# Patient Record
Sex: MALE | Race: WHITE | NOT HISPANIC OR LATINO | Employment: FULL TIME | RURAL
[De-identification: names, ages, dates, MRNs, and addresses within clinical notes are randomized per-mention and may not be internally consistent; named-entity substitution may affect disease eponyms.]

---

## 2020-05-14 ENCOUNTER — HISTORICAL (OUTPATIENT)
Dept: ADMINISTRATIVE | Facility: HOSPITAL | Age: 35
End: 2020-05-14

## 2021-12-30 ENCOUNTER — HOSPITAL ENCOUNTER (EMERGENCY)
Facility: HOSPITAL | Age: 36
Discharge: HOME | End: 2021-12-31
Payer: OTHER GOVERNMENT

## 2021-12-30 DIAGNOSIS — U07.1 COVID-19: Primary | ICD-10-CM

## 2021-12-30 PROCEDURE — 99282 PR EMERGENCY DEPT VISIT,LEVEL II: ICD-10-PCS | Mod: ,,, | Performed by: PEDIATRICS

## 2021-12-30 PROCEDURE — 99282 EMERGENCY DEPT VISIT SF MDM: CPT | Mod: ,,, | Performed by: PEDIATRICS

## 2021-12-30 PROCEDURE — 99283 EMERGENCY DEPT VISIT LOW MDM: CPT

## 2021-12-31 ENCOUNTER — TELEPHONE (OUTPATIENT)
Dept: EMERGENCY MEDICINE | Facility: HOSPITAL | Age: 36
End: 2021-12-31
Payer: OTHER GOVERNMENT

## 2021-12-31 ENCOUNTER — INFUSION (OUTPATIENT)
Dept: INFECTIOUS DISEASES | Facility: HOSPITAL | Age: 36
End: 2021-12-31
Attending: PEDIATRICS
Payer: OTHER GOVERNMENT

## 2021-12-31 VITALS
OXYGEN SATURATION: 96 % | RESPIRATION RATE: 20 BRPM | TEMPERATURE: 98 F | HEIGHT: 71 IN | DIASTOLIC BLOOD PRESSURE: 75 MMHG | HEART RATE: 88 BPM | WEIGHT: 210 LBS | BODY MASS INDEX: 29.4 KG/M2 | SYSTOLIC BLOOD PRESSURE: 118 MMHG

## 2021-12-31 VITALS
SYSTOLIC BLOOD PRESSURE: 144 MMHG | OXYGEN SATURATION: 100 % | TEMPERATURE: 98 F | RESPIRATION RATE: 16 BRPM | DIASTOLIC BLOOD PRESSURE: 79 MMHG | HEART RATE: 86 BPM

## 2021-12-31 DIAGNOSIS — U07.1 COVID-19: Primary | ICD-10-CM

## 2021-12-31 LAB
FLUAV AG UPPER RESP QL IA.RAPID: NEGATIVE
FLUBV AG UPPER RESP QL IA.RAPID: NEGATIVE
RAPID GROUP A STREP: NEGATIVE
SARS-COV+SARS-COV-2 AG RESP QL IA.RAPID: POSITIVE

## 2021-12-31 PROCEDURE — M0243 CASIRIVI AND IMDEVI INFUSION: HCPCS | Performed by: PEDIATRICS

## 2021-12-31 PROCEDURE — 63600175 PHARM REV CODE 636 W HCPCS: Performed by: PEDIATRICS

## 2021-12-31 PROCEDURE — 87880 STREP A ASSAY W/OPTIC: CPT | Performed by: PEDIATRICS

## 2021-12-31 PROCEDURE — 87081 CULTURE SCREEN ONLY: CPT | Performed by: PEDIATRICS

## 2021-12-31 PROCEDURE — 25000003 PHARM REV CODE 250: Performed by: PEDIATRICS

## 2021-12-31 PROCEDURE — 87428 SARSCOV & INF VIR A&B AG IA: CPT | Performed by: PEDIATRICS

## 2021-12-31 RX ORDER — SODIUM CHLORIDE 0.9 % (FLUSH) 0.9 %
10 SYRINGE (ML) INJECTION
Status: DISCONTINUED | OUTPATIENT
Start: 2021-12-31 | End: 2023-05-28 | Stop reason: CLARIF

## 2021-12-31 RX ORDER — ONDANSETRON 4 MG/1
4 TABLET, ORALLY DISINTEGRATING ORAL ONCE AS NEEDED
Status: DISCONTINUED | OUTPATIENT
Start: 2021-12-31 | End: 2023-05-28 | Stop reason: CLARIF

## 2021-12-31 RX ORDER — FLUOXETINE HYDROCHLORIDE 20 MG/1
20 CAPSULE ORAL DAILY
COMMUNITY

## 2021-12-31 RX ORDER — ACETAMINOPHEN 325 MG/1
650 TABLET ORAL ONCE AS NEEDED
Status: DISCONTINUED | OUTPATIENT
Start: 2021-12-31 | End: 2023-05-28 | Stop reason: CLARIF

## 2021-12-31 RX ORDER — ALBUTEROL SULFATE 90 UG/1
2 AEROSOL, METERED RESPIRATORY (INHALATION)
Status: DISCONTINUED | OUTPATIENT
Start: 2021-12-31 | End: 2023-05-28 | Stop reason: CLARIF

## 2021-12-31 RX ORDER — BUPROPION HYDROCHLORIDE 75 MG/1
75 TABLET ORAL DAILY
COMMUNITY

## 2021-12-31 RX ORDER — DEXTROAMPHETAMINE SACCHARATE, AMPHETAMINE ASPARTATE, DEXTROAMPHETAMINE SULFATE AND AMPHETAMINE SULFATE 7.5; 7.5; 7.5; 7.5 MG/1; MG/1; MG/1; MG/1
30 TABLET ORAL 2 TIMES DAILY
COMMUNITY

## 2021-12-31 RX ORDER — PANTOPRAZOLE SODIUM 40 MG/1
40 TABLET, DELAYED RELEASE ORAL DAILY
COMMUNITY
End: 2022-06-01 | Stop reason: SDUPTHER

## 2021-12-31 RX ORDER — ALPRAZOLAM 1 MG/1
1 TABLET ORAL DAILY PRN
COMMUNITY

## 2021-12-31 RX ORDER — DIPHENHYDRAMINE HYDROCHLORIDE 50 MG/ML
25 INJECTION INTRAMUSCULAR; INTRAVENOUS ONCE AS NEEDED
Status: DISCONTINUED | OUTPATIENT
Start: 2021-12-31 | End: 2023-05-28 | Stop reason: CLARIF

## 2021-12-31 RX ORDER — EPINEPHRINE 0.3 MG/.3ML
0.3 INJECTION SUBCUTANEOUS
Status: DISCONTINUED | OUTPATIENT
Start: 2021-12-31 | End: 2023-05-28 | Stop reason: CLARIF

## 2021-12-31 RX ORDER — ONDANSETRON 2 MG/ML
4 INJECTION INTRAMUSCULAR; INTRAVENOUS
Status: DISCONTINUED | OUTPATIENT
Start: 2021-12-31 | End: 2021-12-31

## 2021-12-31 RX ADMIN — CASIRIVIMAB 600 MG: 1332 INJECTION, SOLUTION, CONCENTRATE INTRAVENOUS at 09:12

## 2021-12-31 NOTE — DISCHARGE INSTRUCTIONS
Tylenol or ibuprofen for fever and body aches    Consider taking a multivitamin daily    Return tomorrow for antibody infusion

## 2021-12-31 NOTE — ED PROVIDER NOTES
Encounter Date: 12/30/2021       History     Chief Complaint   Patient presents with    COVID-19 Concerns    Cough    Headache    Fever    Weakness    Vomiting    Nausea    Dizziness    Shortness of Breath    Sore Throat       Patient reports exposure to COVID.  He does report that he has been immunized for COVID but no flu vaccine.  He reports he has had fevers chills nausea vomiting diarrhea aches all over.        Review of patient's allergies indicates:  No Known Allergies  Past Medical History:   Diagnosis Date    Depression     GERD (gastroesophageal reflux disease)      Past Surgical History:   Procedure Laterality Date    TONSILLECTOMY       History reviewed. No pertinent family history.  Social History     Tobacco Use    Smoking status: Current Every Day Smoker     Types: Cigarettes    Smokeless tobacco: Never Used   Substance Use Topics    Alcohol use: Yes    Drug use: Not Currently     Review of Systems   Constitutional: Positive for chills, diaphoresis and fever.   HENT: Positive for congestion and sore throat.    Respiratory: Positive for cough. Negative for shortness of breath.    Cardiovascular: Negative for chest pain.   Gastrointestinal: Positive for diarrhea and vomiting.   Musculoskeletal: Positive for myalgias.   Skin: Negative for color change and rash.   Neurological: Positive for headaches.   All other systems reviewed and are negative.      Physical Exam     Initial Vitals [12/30/21 2345]   BP Pulse Resp Temp SpO2   (!) 127/92 91 20 98.1 °F (36.7 °C) 95 %      MAP       --         Physical Exam    Nursing note and vitals reviewed.  Constitutional: He appears well-developed and well-nourished. He is diaphoretic.   HENT:   Mouth/Throat: Uvula is midline. Oropharyngeal exudate and posterior oropharyngeal erythema present. No posterior oropharyngeal edema.   Neck: Neck supple.   Cardiovascular: Normal rate, regular rhythm, normal heart sounds and intact distal pulses.   No murmur  heard.  Pulmonary/Chest: Breath sounds normal. No respiratory distress. He has no wheezes. He has no rhonchi.   Musculoskeletal:      Cervical back: Neck supple.     Neurological: He is alert and oriented to person, place, and time. He has normal strength.   Skin: Skin is warm. Capillary refill takes less than 2 seconds.   Psychiatric: He has a normal mood and affect. His behavior is normal. Judgment and thought content normal.         Medical Screening Exam   See Full Note    ED Course   Procedures  Labs Reviewed   SARS-COV2 (COVID) W/ FLU ANTIGEN - Abnormal; Notable for the following components:       Result Value    COVID-19 Ag Positive (*)     All other components within normal limits    Narrative:     Positive SARS-CoV antigen results indicate the presence of viral antigens; correlation with patient history and presence of clinical signs & symptoms consistent with COVID-19 are necessary to determine infection status.   THROAT SCREEN, RAPID STREP          Imaging Results    None          Medications - No data to display                    Clinical Impression:   Final diagnoses:  [U07.1] COVID-19 (Primary)          ED Disposition Condition    Discharge Stable        ED Prescriptions     None        Follow-up Information     Follow up With Specialties Details Why Contact Info    Rose Mary Camacho MD Family Medicine In 3 days If symptoms worsen [address] [facility] [address] [phone]             Andrae Nava MD  12/31/21 8854

## 2021-12-31 NOTE — ED TRIAGE NOTES
Presents with fever, cough, sore throat, N/V, diarrhea, HA, dizziness, and shortness of breath. Was around ppl with Covid on Sunday.

## 2022-01-02 LAB — DEPRECATED S PYO AG THROAT QL EIA: NORMAL

## 2022-06-01 ENCOUNTER — OFFICE VISIT (OUTPATIENT)
Dept: PRIMARY CARE CLINIC | Facility: CLINIC | Age: 37
End: 2022-06-01
Payer: COMMERCIAL

## 2022-06-01 VITALS
WEIGHT: 228 LBS | DIASTOLIC BLOOD PRESSURE: 86 MMHG | BODY MASS INDEX: 43.05 KG/M2 | OXYGEN SATURATION: 95 % | SYSTOLIC BLOOD PRESSURE: 132 MMHG | HEIGHT: 61 IN | TEMPERATURE: 98 F | RESPIRATION RATE: 18 BRPM | HEART RATE: 86 BPM

## 2022-06-01 DIAGNOSIS — R11.2 NAUSEA AND VOMITING, INTRACTABILITY OF VOMITING NOT SPECIFIED, UNSPECIFIED VOMITING TYPE: ICD-10-CM

## 2022-06-01 DIAGNOSIS — R63.5 WEIGHT GAIN: ICD-10-CM

## 2022-06-01 DIAGNOSIS — J30.2 SEASONAL ALLERGIC RHINITIS, UNSPECIFIED TRIGGER: ICD-10-CM

## 2022-06-01 DIAGNOSIS — K92.0 HEMATEMESIS WITH NAUSEA: ICD-10-CM

## 2022-06-01 DIAGNOSIS — Z00.00 ENCOUNTER FOR ROUTINE ADULT MEDICAL EXAMINATION: ICD-10-CM

## 2022-06-01 DIAGNOSIS — R10.826 EPIGASTRIC ABDOMINAL TENDERNESS WITH REBOUND TENDERNESS: Primary | ICD-10-CM

## 2022-06-01 DIAGNOSIS — K21.9 GASTROESOPHAGEAL REFLUX DISEASE, UNSPECIFIED WHETHER ESOPHAGITIS PRESENT: ICD-10-CM

## 2022-06-01 PROCEDURE — 3008F BODY MASS INDEX DOCD: CPT | Mod: CPTII,,, | Performed by: NURSE PRACTITIONER

## 2022-06-01 PROCEDURE — 3079F PR MOST RECENT DIASTOLIC BLOOD PRESSURE 80-89 MM HG: ICD-10-PCS | Mod: CPTII,,, | Performed by: NURSE PRACTITIONER

## 2022-06-01 PROCEDURE — 99204 PR OFFICE/OUTPT VISIT, NEW, LEVL IV, 45-59 MIN: ICD-10-PCS | Mod: ,,, | Performed by: NURSE PRACTITIONER

## 2022-06-01 PROCEDURE — 1160F PR REVIEW ALL MEDS BY PRESCRIBER/CLIN PHARMACIST DOCUMENTED: ICD-10-PCS | Mod: CPTII,,, | Performed by: NURSE PRACTITIONER

## 2022-06-01 PROCEDURE — 83014 H PYLORI DRUG ADMIN: CPT | Mod: ,,, | Performed by: CLINICAL MEDICAL LABORATORY

## 2022-06-01 PROCEDURE — 1159F PR MEDICATION LIST DOCUMENTED IN MEDICAL RECORD: ICD-10-PCS | Mod: CPTII,,, | Performed by: NURSE PRACTITIONER

## 2022-06-01 PROCEDURE — 83013 H PYLORI (C-13) BREATH: CPT | Mod: ,,, | Performed by: CLINICAL MEDICAL LABORATORY

## 2022-06-01 PROCEDURE — 83014 H. PYLORI BREATH TEST: ICD-10-PCS | Mod: ,,, | Performed by: CLINICAL MEDICAL LABORATORY

## 2022-06-01 PROCEDURE — 3075F PR MOST RECENT SYSTOLIC BLOOD PRESS GE 130-139MM HG: ICD-10-PCS | Mod: CPTII,,, | Performed by: NURSE PRACTITIONER

## 2022-06-01 PROCEDURE — 3079F DIAST BP 80-89 MM HG: CPT | Mod: CPTII,,, | Performed by: NURSE PRACTITIONER

## 2022-06-01 PROCEDURE — 84443 TSH: ICD-10-PCS | Mod: ,,, | Performed by: CLINICAL MEDICAL LABORATORY

## 2022-06-01 PROCEDURE — 3075F SYST BP GE 130 - 139MM HG: CPT | Mod: CPTII,,, | Performed by: NURSE PRACTITIONER

## 2022-06-01 PROCEDURE — 84443 ASSAY THYROID STIM HORMONE: CPT | Mod: ,,, | Performed by: CLINICAL MEDICAL LABORATORY

## 2022-06-01 PROCEDURE — 3008F PR BODY MASS INDEX (BMI) DOCUMENTED: ICD-10-PCS | Mod: CPTII,,, | Performed by: NURSE PRACTITIONER

## 2022-06-01 PROCEDURE — 80053 COMPREHENSIVE METABOLIC PANEL: ICD-10-PCS | Mod: ,,, | Performed by: CLINICAL MEDICAL LABORATORY

## 2022-06-01 PROCEDURE — 85025 CBC WITH DIFFERENTIAL: ICD-10-PCS | Mod: ,,, | Performed by: CLINICAL MEDICAL LABORATORY

## 2022-06-01 PROCEDURE — 83013 H. PYLORI BREATH TEST: ICD-10-PCS | Mod: ,,, | Performed by: CLINICAL MEDICAL LABORATORY

## 2022-06-01 PROCEDURE — 85025 COMPLETE CBC W/AUTO DIFF WBC: CPT | Mod: ,,, | Performed by: CLINICAL MEDICAL LABORATORY

## 2022-06-01 PROCEDURE — 1160F RVW MEDS BY RX/DR IN RCRD: CPT | Mod: CPTII,,, | Performed by: NURSE PRACTITIONER

## 2022-06-01 PROCEDURE — 1159F MED LIST DOCD IN RCRD: CPT | Mod: CPTII,,, | Performed by: NURSE PRACTITIONER

## 2022-06-01 PROCEDURE — 99204 OFFICE O/P NEW MOD 45 MIN: CPT | Mod: ,,, | Performed by: NURSE PRACTITIONER

## 2022-06-01 PROCEDURE — 80053 COMPREHEN METABOLIC PANEL: CPT | Mod: ,,, | Performed by: CLINICAL MEDICAL LABORATORY

## 2022-06-01 RX ORDER — MINERAL OIL
180 ENEMA (ML) RECTAL DAILY
Qty: 90 TABLET | Refills: 3 | Status: SHIPPED | OUTPATIENT
Start: 2022-06-01 | End: 2023-06-01

## 2022-06-01 RX ORDER — PANTOPRAZOLE SODIUM 40 MG/1
40 TABLET, DELAYED RELEASE ORAL DAILY
Qty: 90 TABLET | Refills: 1 | Status: SHIPPED | OUTPATIENT
Start: 2022-06-01

## 2022-06-01 NOTE — LETTER
June 1, 2022      [facility]  [address] [place] [address]  Phone: [phone]  Fax: [phone]       Patient: Jesus Rangel   YOB: 1985  Date of Visit: 06/01/2022    To Whom It May Concern:    Damaris Rangel  was at [facility] on 06/01/2022. The patient may return to work/school on 06/02/2022 with no restrictions. If you have any questions or concerns, or if I can be of further assistance, please do not hesitate to contact me.    Sincerely,    Alona Mathew LPN

## 2022-06-01 NOTE — LETTER
June 1, 2022      [facility]  [address] [place] [address]  Phone: [phone]  Fax: [phone]       Patient: Jesus Rangel   YOB: 1985  Date of Visit: 06/01/2022    To Whom It May Concern:    Damaris Rangel  was at [facility] on 06/01/2022. The patient may return to work on 6/2/2022 with no restrictions. If you have any questions or concerns, or if I can be of further assistance, please do not hesitate to contact me.    Sincerely,    Lupis Seay DNP, FNP-C

## 2022-06-01 NOTE — LETTER
June 1, 2022      [facility]  [address] [place] [address]  Phone: [phone]  Fax: [phone]       Patient: Jesus Rangel   YOB: 1985  Date of Visit: 06/01/2022    To Whom It May Concern:    Damaris Rangel  was at [facility] on 06/01/2022. The patient may return to work/school on 06/02/2022 with no restrictions. If you have any questions or concerns, or if I can be of further assistance, please do not hesitate to contact me.    Sincerely,    Alona Mathew LPN

## 2022-06-01 NOTE — PROGRESS NOTES
[facility]  Primary Care       PATIENT NAME: Jesus Rangel   : 1985    AGE: 36 y.o. DATE: 2022    MRN: [id number]        Reason for Visit / Chief Complaint:  Sinusitis (Nasal drainage, eyes have been watery and pinkish, pt states that when he  is  outdoors enjoying the outdoors  he seems to develop a rash and it begins to obstruct his breathing. ), Headache, and Gastroesophageal Reflux (Has  been throwing up blood it  with a sticky constistency)     Subjective:     HPI: Patient states he has been vomiting blood for the past week; states he has changed his diet to bland foods; states he GERD issues.     Patient states he has allergies that causes red eyes, swelling to face, itchy eyes, watery discharge from eyes, runny nose.          Review of Systems: Review of Systems   Constitutional: Negative for fever.   Respiratory: Negative for cough and shortness of breath.    Cardiovascular: Negative for chest pain.   Gastrointestinal: Positive for abdominal pain, diarrhea, nausea and vomiting.   Genitourinary: Negative for dysuria.   Musculoskeletal: Negative for gait problem.   Skin: Negative for rash.   Neurological: Negative for headaches.          Review of patient's allergies indicates:  No Known Allergies     Med List:  Current Outpatient Medications on File Prior to Visit   Medication Sig Dispense Refill    ALPRAZolam (XANAX) 1 MG tablet Take 1 mg by mouth daily as needed for Anxiety.      buPROPion (WELLBUTRIN) 75 MG tablet Take 75 mg by mouth once daily.      dextroamphetamine-amphetamine 30 mg Tab Take 30 mg by mouth 2 (two) times a day.      FLUoxetine 20 MG capsule Take 20 mg by mouth once daily. Three capsules      [DISCONTINUED] pantoprazole (PROTONIX) 40 MG tablet Take 40 mg by mouth once daily.       Current Facility-Administered Medications on File Prior to Visit   Medication Dose Route Frequency Provider Last Rate Last Admin    acetaminophen tablet 650 mg  650 mg  "Oral Once PRN Andrae Nava MD        albuterol inhaler 2 puff  2 puff Inhalation Q20 Min PRN Andrae Nava MD        diphenhydrAMINE injection 25 mg  25 mg Intravenous Once PRN Andrae Nava MD        EPINEPHrine (EPIPEN) 0.3 mg/0.3 mL pen injection 0.3 mg  0.3 mg Intramuscular PRN Andrae Nava MD        methylPREDNISolone sodium succinate injection 40 mg  40 mg Intravenous Once PRN Andrae Nava MD        ondansetron disintegrating tablet 4 mg  4 mg Oral Once PRN Andrae Nava MD        sodium chloride 0.9% 500 mL flush bag   Intravenous PRN Andrae Nava MD        sodium chloride 0.9% flush 10 mL  10 mL Intravenous PRN Andrae Nava MD           Medical/Social/Family History:  Past Medical History:   Diagnosis Date    Depression     GERD (gastroesophageal reflux disease)       Social History     Tobacco Use   Smoking Status Former Smoker    Types: Cigarettes    Quit date: 2019    Years since quittin.5   Smokeless Tobacco Never Used      Social History     Substance and Sexual Activity   Alcohol Use Yes       History reviewed. No pertinent family history.   Past Surgical History:   Procedure Laterality Date    TONSILLECTOMY          There is no immunization history on file for this patient.       Objective:      Vitals:    22 1526   BP: 132/86   BP Location: Left arm   Patient Position: Sitting   BP Method: Large (Automatic)   Pulse: 86   Resp: 18   Temp: 97.8 °F (36.6 °C)   TempSrc: Oral   SpO2: 95%   Weight: 103.4 kg (228 lb)   Height: 5' 1" (1.549 m)     Body mass index is 43.08 kg/m².     Physical Exam: Physical Exam  Constitutional:       Appearance: Normal appearance.   HENT:      Head: Normocephalic.      Mouth/Throat:      Mouth: Mucous membranes are moist.   Eyes:      Pupils: Pupils are equal, round, and reactive to light.   Cardiovascular:      Rate and Rhythm: Normal rate and regular rhythm.      Heart sounds: Normal heart sounds.   Pulmonary:      " Effort: Pulmonary effort is normal. No respiratory distress.      Breath sounds: Normal breath sounds. No wheezing or rhonchi.   Abdominal:      General: Bowel sounds are normal. There is no distension.      Palpations: Abdomen is soft.      Tenderness: There is abdominal tenderness (epigastric tenderness with palpation).   Musculoskeletal:         General: Normal range of motion.      Cervical back: Normal range of motion.   Skin:     General: Skin is warm and dry.   Neurological:      General: No focal deficit present.      Mental Status: He is alert and oriented to person, place, and time.      Gait: Gait normal.   Psychiatric:         Mood and Affect: Mood normal.         Behavior: Behavior normal.                Assessment:          ICD-10-CM ICD-9-CM   1. Epigastric abdominal tenderness with rebound tenderness  R10.826 789.66   2. Gastroesophageal reflux disease, unspecified whether esophagitis present  K21.9 530.81   3. Encounter for routine adult medical examination  Z00.00 V70.0   4. Nausea and vomiting, intractability of vomiting not specified, unspecified vomiting type  R11.2 787.01   5. Weight gain  R63.5 783.1   6. Seasonal allergic rhinitis, unspecified trigger  J30.2 477.9   7. Hematemesis with nausea  K92.0 578.0     787.02        Plan:       Epigastric abdominal tenderness with rebound tenderness  -     H. pylori Breath Test; Future; Expected date: 06/01/2022  -     Ambulatory referral/consult to Gastroenterology; Future; Expected date: 06/08/2022    Gastroesophageal reflux disease, unspecified whether esophagitis present  -     H. pylori Breath Test; Future; Expected date: 06/01/2022  -     pantoprazole (PROTONIX) 40 MG tablet; Take 1 tablet (40 mg total) by mouth once daily.  Dispense: 90 tablet; Refill: 1  -     Ambulatory referral/consult to Gastroenterology; Future; Expected date: 06/08/2022    Encounter for routine adult medical examination  -     CBC Auto Differential; Future; Expected date:  06/01/2022  -     Comprehensive Metabolic Panel; Future; Expected date: 06/01/2022    Nausea and vomiting, intractability of vomiting not specified, unspecified vomiting type    Weight gain  -     TSH; Future; Expected date: 06/01/2022    Seasonal allergic rhinitis, unspecified trigger  -     fexofenadine (ALLEGRA) 180 MG tablet; Take 1 tablet (180 mg total) by mouth once daily.  Dispense: 90 tablet; Refill: 3    Hematemesis with nausea  -     Ambulatory referral/consult to Gastroenterology; Future; Expected date: 06/08/2022          New & refilled meds:  Requested Prescriptions     Signed Prescriptions Disp Refills    pantoprazole (PROTONIX) 40 MG tablet 90 tablet 1     Sig: Take 1 tablet (40 mg total) by mouth once daily.    fexofenadine (ALLEGRA) 180 MG tablet 90 tablet 3     Sig: Take 1 tablet (180 mg total) by mouth once daily.       Follow up if symptoms worsen or fail to improve.     There are no Patient Instructions on file for this visit.       Signature: Lupis Seay DNP, FNP-C

## 2022-06-06 LAB
ALBUMIN SERPL BCP-MCNC: 4.4 G/DL (ref 3.5–5)
ALBUMIN/GLOB SERPL: 1.3 {RATIO}
ALP SERPL-CCNC: 134 U/L (ref 45–115)
ALT SERPL W P-5'-P-CCNC: 208 U/L (ref 16–61)
ANION GAP SERPL CALCULATED.3IONS-SCNC: 12 MMOL/L (ref 7–16)
AST SERPL W P-5'-P-CCNC: 186 U/L (ref 15–37)
BASOPHILS # BLD AUTO: 0.06 K/UL (ref 0–0.2)
BASOPHILS NFR BLD AUTO: 0.8 % (ref 0–1)
BILIRUB SERPL-MCNC: 0.7 MG/DL (ref 0–1.2)
BUN SERPL-MCNC: 8 MG/DL (ref 7–18)
BUN/CREAT SERPL: 10 (ref 6–20)
CALCIUM SERPL-MCNC: 9.3 MG/DL (ref 8.5–10.1)
CHLORIDE SERPL-SCNC: 109 MMOL/L (ref 98–107)
CO2 SERPL-SCNC: 29 MMOL/L (ref 21–32)
CREAT SERPL-MCNC: 0.77 MG/DL (ref 0.7–1.3)
DIFFERENTIAL METHOD BLD: ABNORMAL
EOSINOPHIL # BLD AUTO: 0.35 K/UL (ref 0–0.5)
EOSINOPHIL NFR BLD AUTO: 4.8 % (ref 1–4)
ERYTHROCYTE [DISTWIDTH] IN BLOOD BY AUTOMATED COUNT: 13.1 % (ref 11.5–14.5)
GLOBULIN SER-MCNC: 3.3 G/DL (ref 2–4)
GLUCOSE SERPL-MCNC: 102 MG/DL (ref 74–106)
HCT VFR BLD AUTO: 46 % (ref 40–54)
HGB BLD-MCNC: 15.8 G/DL (ref 13.5–18)
IMM GRANULOCYTES # BLD AUTO: 0.07 K/UL (ref 0–0.04)
IMM GRANULOCYTES NFR BLD: 1 % (ref 0–0.4)
LYMPHOCYTES # BLD AUTO: 2.19 K/UL (ref 1–4.8)
LYMPHOCYTES NFR BLD AUTO: 30.1 % (ref 27–41)
MCH RBC QN AUTO: 33.8 PG (ref 27–31)
MCHC RBC AUTO-ENTMCNC: 34.3 G/DL (ref 32–36)
MCV RBC AUTO: 98.3 FL (ref 80–96)
MONOCYTES # BLD AUTO: 0.6 K/UL (ref 0–0.8)
MONOCYTES NFR BLD AUTO: 8.3 % (ref 2–6)
MPC BLD CALC-MCNC: 10.8 FL (ref 9.4–12.4)
NEUTROPHILS # BLD AUTO: 4 K/UL (ref 1.8–7.7)
NEUTROPHILS NFR BLD AUTO: 55 % (ref 53–65)
NRBC # BLD AUTO: 0 X10E3/UL
NRBC, AUTO (.00): 0 %
PLATELET # BLD AUTO: 242 K/UL (ref 150–400)
POTASSIUM SERPL-SCNC: 4.5 MMOL/L (ref 3.5–5.1)
PROT SERPL-MCNC: 7.7 G/DL (ref 6.4–8.2)
RBC # BLD AUTO: 4.68 M/UL (ref 4.6–6.2)
SODIUM SERPL-SCNC: 145 MMOL/L (ref 136–145)
TSH SERPL DL<=0.005 MIU/L-ACNC: 0.47 UIU/ML (ref 0.36–3.74)
UREA BREATH TEST QL: NEGATIVE
WBC # BLD AUTO: 7.27 K/UL (ref 4.5–11)

## 2022-06-08 ENCOUNTER — TELEPHONE (OUTPATIENT)
Dept: PRIMARY CARE CLINIC | Facility: CLINIC | Age: 37
End: 2022-06-08
Payer: COMMERCIAL

## 2022-06-08 NOTE — TELEPHONE ENCOUNTER
----- Message from Lupis Seay DNP, CAROLYNEP-C sent at 6/7/2022  9:39 AM CDT -----  Please notify patient of results; his liver enzymes are elevated; patient needs to avoid all alcoholic beverages and meds containing Tylenol. Needs a hepatitis panel/profile. If he agrees, he will need to come back in for lab.

## 2022-06-13 ENCOUNTER — TELEPHONE (OUTPATIENT)
Dept: PRIMARY CARE CLINIC | Facility: CLINIC | Age: 37
End: 2022-06-13
Payer: COMMERCIAL

## 2022-06-15 ENCOUNTER — TELEPHONE (OUTPATIENT)
Dept: PRIMARY CARE CLINIC | Facility: CLINIC | Age: 37
End: 2022-06-15
Payer: COMMERCIAL

## 2022-06-29 ENCOUNTER — OFFICE VISIT (OUTPATIENT)
Dept: PRIMARY CARE CLINIC | Facility: CLINIC | Age: 37
End: 2022-06-29
Payer: COMMERCIAL

## 2022-06-29 VITALS
HEIGHT: 71 IN | WEIGHT: 232 LBS | RESPIRATION RATE: 20 BRPM | SYSTOLIC BLOOD PRESSURE: 130 MMHG | DIASTOLIC BLOOD PRESSURE: 84 MMHG | TEMPERATURE: 98 F | HEART RATE: 91 BPM | BODY MASS INDEX: 32.48 KG/M2 | OXYGEN SATURATION: 95 %

## 2022-06-29 DIAGNOSIS — F10.20 ALCOHOLISM: ICD-10-CM

## 2022-06-29 DIAGNOSIS — F41.1 GENERALIZED ANXIETY DISORDER: ICD-10-CM

## 2022-06-29 DIAGNOSIS — Z91.89 AT RISK FOR SEXUALLY TRANSMITTED DISEASE DUE TO PARTNER WITH MULTIPLE PARTNERS: Primary | ICD-10-CM

## 2022-06-29 LAB
HAV IGM SER QL: NORMAL
HBV CORE IGM SER QL: NORMAL
HBV SURFACE AG SERPL QL IA: NORMAL
HCV AB SER QL: NORMAL
HIV 1+O+2 AB SERPL QL: NORMAL
SYPHILIS AB INTERPRETATION: NORMAL

## 2022-06-29 PROCEDURE — 99213 OFFICE O/P EST LOW 20 MIN: CPT | Mod: ,,, | Performed by: FAMILY MEDICINE

## 2022-06-29 PROCEDURE — 87389 HIV-1 AG W/HIV-1&-2 AB AG IA: CPT | Mod: ,,, | Performed by: CLINICAL MEDICAL LABORATORY

## 2022-06-29 PROCEDURE — 86695 HERPES SIMPLEX 1 & 2 IGG: ICD-10-PCS | Mod: ,,, | Performed by: CLINICAL MEDICAL LABORATORY

## 2022-06-29 PROCEDURE — 3008F PR BODY MASS INDEX (BMI) DOCUMENTED: ICD-10-PCS | Mod: CPTII,,, | Performed by: FAMILY MEDICINE

## 2022-06-29 PROCEDURE — 3075F SYST BP GE 130 - 139MM HG: CPT | Mod: CPTII,,, | Performed by: FAMILY MEDICINE

## 2022-06-29 PROCEDURE — 86780 TREPONEMA PALLIDUM (SYPHILIS) ANTIBODY: ICD-10-PCS | Mod: ,,, | Performed by: CLINICAL MEDICAL LABORATORY

## 2022-06-29 PROCEDURE — 3008F BODY MASS INDEX DOCD: CPT | Mod: CPTII,,, | Performed by: FAMILY MEDICINE

## 2022-06-29 PROCEDURE — 3075F PR MOST RECENT SYSTOLIC BLOOD PRESS GE 130-139MM HG: ICD-10-PCS | Mod: CPTII,,, | Performed by: FAMILY MEDICINE

## 2022-06-29 PROCEDURE — 3079F DIAST BP 80-89 MM HG: CPT | Mod: CPTII,,, | Performed by: FAMILY MEDICINE

## 2022-06-29 PROCEDURE — 86694 HERPES SIMPLEX 1 & 2 IGM: ICD-10-PCS | Mod: ,,, | Performed by: CLINICAL MEDICAL LABORATORY

## 2022-06-29 PROCEDURE — 86696 HERPES SIMPLEX TYPE 2 TEST: CPT | Mod: ,,, | Performed by: CLINICAL MEDICAL LABORATORY

## 2022-06-29 PROCEDURE — 86780 TREPONEMA PALLIDUM: CPT | Mod: ,,, | Performed by: CLINICAL MEDICAL LABORATORY

## 2022-06-29 PROCEDURE — 1159F PR MEDICATION LIST DOCUMENTED IN MEDICAL RECORD: ICD-10-PCS | Mod: CPTII,,, | Performed by: FAMILY MEDICINE

## 2022-06-29 PROCEDURE — 87389 HIV 1 / 2 ANTIBODY: ICD-10-PCS | Mod: ,,, | Performed by: CLINICAL MEDICAL LABORATORY

## 2022-06-29 PROCEDURE — 86696 HERPES SIMPLEX 1 & 2 IGG: ICD-10-PCS | Mod: ,,, | Performed by: CLINICAL MEDICAL LABORATORY

## 2022-06-29 PROCEDURE — 1159F MED LIST DOCD IN RCRD: CPT | Mod: CPTII,,, | Performed by: FAMILY MEDICINE

## 2022-06-29 PROCEDURE — 80074 HEPATITIS PANEL, ACUTE: ICD-10-PCS | Mod: ,,, | Performed by: CLINICAL MEDICAL LABORATORY

## 2022-06-29 PROCEDURE — 86694 HERPES SIMPLEX NES ANTBDY: CPT | Mod: ,,, | Performed by: CLINICAL MEDICAL LABORATORY

## 2022-06-29 PROCEDURE — 3079F PR MOST RECENT DIASTOLIC BLOOD PRESSURE 80-89 MM HG: ICD-10-PCS | Mod: CPTII,,, | Performed by: FAMILY MEDICINE

## 2022-06-29 PROCEDURE — 80074 ACUTE HEPATITIS PANEL: CPT | Mod: ,,, | Performed by: CLINICAL MEDICAL LABORATORY

## 2022-06-29 PROCEDURE — 99213 PR OFFICE/OUTPT VISIT, EST, LEVL III, 20-29 MIN: ICD-10-PCS | Mod: ,,, | Performed by: FAMILY MEDICINE

## 2022-06-29 PROCEDURE — 86695 HERPES SIMPLEX TYPE 1 TEST: CPT | Mod: ,,, | Performed by: CLINICAL MEDICAL LABORATORY

## 2022-06-29 NOTE — LETTER
June 29, 2022      [facility]  [address] [place] [address]  Phone: [phone]  Fax: [phone]       Patient: Jesus Rangel   YOB: 1985  Date of Visit: 06/29/2022    To Whom It May Concern:    Damaris Rangel  was at [facility] on 06/29/2022. The patient may return to work 6/30/2022 with no restrictions. If you have any questions or concerns, or if I can be of further assistance, please do not hesitate to contact me.    Sincerely,    Deedee Abreu LPN

## 2022-06-29 NOTE — PROGRESS NOTES
Subjective:       Patient ID: Jesus Rangel is a 36 y.o. male.    Chief Complaint: Consult    Pt. Here for help. He drinks 1/2 gallon of vodka daily. Long discussion. He has not told his psychiatrist this. He wants to go to a medical detox in 2 weeks - when his financial situation will be better. Strongly advised that he begin to taper his alcohol consumption now. He cannot suddenly stop drinking without bad things happening. He understands.    Review of Systems   Constitutional: Positive for unexpected weight change. Negative for fatigue and fever.   HENT: Negative for dental problem.    Eyes: Negative for discharge.   Respiratory: Negative for cough, choking, chest tightness and shortness of breath.    Cardiovascular: Negative for chest pain and leg swelling.   Gastrointestinal: Negative for constipation, diarrhea, nausea and vomiting.   Genitourinary: Negative for discharge and flank pain.   Musculoskeletal: Negative for arthralgias and myalgias.   Allergic/Immunologic: Negative for environmental allergies.   Neurological: Negative for headaches and memory loss.   Psychiatric/Behavioral: Negative for behavioral problems and hallucinations. The patient is nervous/anxious.          Objective:      Physical Exam  Vitals and nursing note reviewed.   Constitutional:       Appearance: Normal appearance. He is normal weight.   HENT:      Head: Normocephalic and atraumatic.      Right Ear: Tympanic membrane normal.      Left Ear: Tympanic membrane normal.      Nose: Nose normal.      Mouth/Throat:      Mouth: Mucous membranes are moist.   Eyes:      Extraocular Movements: Extraocular movements intact.      Conjunctiva/sclera: Conjunctivae normal.      Pupils: Pupils are equal, round, and reactive to light.   Cardiovascular:      Rate and Rhythm: Normal rate and regular rhythm.      Pulses: Normal pulses.   Pulmonary:      Effort: Pulmonary effort is normal.      Breath sounds: Normal breath sounds.   Abdominal:       General: Abdomen is flat. Bowel sounds are normal.      Palpations: Abdomen is soft.   Musculoskeletal:         General: Normal range of motion.      Cervical back: Normal range of motion and neck supple.   Skin:     General: Skin is warm and dry.   Neurological:      General: No focal deficit present.      Mental Status: He is alert and oriented to person, place, and time.   Psychiatric:         Mood and Affect: Mood normal.         Assessment:       Problem List Items Addressed This Visit        Psychiatric    Alcoholism    Generalized anxiety disorder       ID    At risk for sexually transmitted disease due to partner with multiple partners - Primary    Relevant Orders    HIV 1/2 Ag/Ab (4th Gen)    HSV 1 & 2, IgG    HSV 1 & 2, IgM    Syphilis Antibody with reflex to RPR    Hepatitis Panel, Acute          Plan:       pt. Understands what to do. He will return in 2 weeks for referral to an inpatient detox center far away from here

## 2022-06-30 ENCOUNTER — TELEPHONE (OUTPATIENT)
Dept: PRIMARY CARE CLINIC | Facility: CLINIC | Age: 37
End: 2022-06-30
Payer: COMMERCIAL

## 2022-06-30 LAB
HSV IGM SER QL IA: NEGATIVE
HSV TYPE 1 AB IGG INDEX: 0.11
HSV TYPE 2 AB IGG INDEX: >7.52
HSV1 IGG SER QL: NEGATIVE
HSV2 IGG SER QL: POSITIVE

## 2022-06-30 NOTE — TELEPHONE ENCOUNTER
----- Message from Rose Mary Camacho MD sent at 6/30/2022 11:22 AM CDT -----  Please give pt. results

## 2023-05-28 ENCOUNTER — HOSPITAL ENCOUNTER (EMERGENCY)
Facility: HOSPITAL | Age: 38
Discharge: HOME | End: 2023-05-28
Attending: EMERGENCY MEDICINE

## 2023-05-28 VITALS
SYSTOLIC BLOOD PRESSURE: 147 MMHG | TEMPERATURE: 98 F | HEIGHT: 71 IN | RESPIRATION RATE: 18 BRPM | BODY MASS INDEX: 31.27 KG/M2 | WEIGHT: 223.38 LBS | HEART RATE: 87 BPM | DIASTOLIC BLOOD PRESSURE: 84 MMHG | OXYGEN SATURATION: 98 %

## 2023-05-28 DIAGNOSIS — T14.90XA INJURY: ICD-10-CM

## 2023-05-28 DIAGNOSIS — S93.492A SPRAIN OF ANTERIOR TALOFIBULAR LIGAMENT OF LEFT ANKLE, INITIAL ENCOUNTER: Primary | ICD-10-CM

## 2023-05-28 DIAGNOSIS — S93.491A SPRAIN OF ANTERIOR TALOFIBULAR LIGAMENT OF RIGHT ANKLE, INITIAL ENCOUNTER: ICD-10-CM

## 2023-05-28 PROCEDURE — 63600175 PHARM REV CODE 636 W HCPCS: Performed by: EMERGENCY MEDICINE

## 2023-05-28 PROCEDURE — 96372 THER/PROPH/DIAG INJ SC/IM: CPT | Performed by: EMERGENCY MEDICINE

## 2023-05-28 PROCEDURE — 99284 EMERGENCY DEPT VISIT MOD MDM: CPT | Mod: ,,, | Performed by: EMERGENCY MEDICINE

## 2023-05-28 PROCEDURE — 99284 PR EMERGENCY DEPT VISIT,LEVEL IV: ICD-10-PCS | Mod: ,,, | Performed by: EMERGENCY MEDICINE

## 2023-05-28 PROCEDURE — 99284 EMERGENCY DEPT VISIT MOD MDM: CPT

## 2023-05-28 RX ORDER — KETOROLAC TROMETHAMINE 30 MG/ML
30 INJECTION, SOLUTION INTRAMUSCULAR; INTRAVENOUS
Status: COMPLETED | OUTPATIENT
Start: 2023-05-28 | End: 2023-05-28

## 2023-05-28 RX ORDER — NAPROXEN 500 MG/1
500 TABLET ORAL 2 TIMES DAILY PRN
Qty: 14 TABLET | Refills: 0 | Status: SHIPPED | OUTPATIENT
Start: 2023-05-28 | End: 2023-06-04

## 2023-05-28 RX ADMIN — KETOROLAC TROMETHAMINE 30 MG: 30 INJECTION, SOLUTION INTRAMUSCULAR; INTRAVENOUS at 05:05

## 2023-05-28 NOTE — ED PROVIDER NOTES
Encounter Date: 5/28/2023       History     Chief Complaint   Patient presents with    Ankle Pain     bilat     Patient presents with bilateral ankle pain anterolateral aspect of each ankle, after stumbling in a hole 2 days ago.  Has had increased pain and swelling over the past 2 days.  Has been wearing a tight Ace wrap around each ankle.    Review of patient's allergies indicates:  No Known Allergies  Past Medical History:   Diagnosis Date    Depression     GERD (gastroesophageal reflux disease)      Past Surgical History:   Procedure Laterality Date    TONSILLECTOMY       Family History   Problem Relation Age of Onset    Cancer Father      Social History     Tobacco Use    Smoking status: Former     Types: Vaping with nicotine, Cigarettes    Smokeless tobacco: Never   Substance Use Topics    Alcohol use: Yes    Drug use: Not Currently     Review of Systems   Constitutional: Negative.    HENT: Negative.     Eyes: Negative.    Respiratory: Negative.     Cardiovascular: Negative.    Gastrointestinal: Negative.    Genitourinary: Negative.    Musculoskeletal:  Positive for joint swelling (Bilateral ankle pain and swelling.). Negative for arthralgias, back pain, myalgias, neck pain and neck stiffness.   Skin: Negative.    Neurological: Negative.  Negative for weakness and numbness.   Psychiatric/Behavioral: Negative.     All other systems reviewed and are negative.    Physical Exam     Initial Vitals [05/28/23 1520]   BP Pulse Resp Temp SpO2   (!) 147/84 91 20 98.2 °F (36.8 °C) 99 %      MAP       --         Physical Exam    Nursing note and vitals reviewed.  Constitutional: He appears well-developed and well-nourished.   HENT:   Head: Atraumatic.   Right Ear: External ear normal.   Left Ear: External ear normal.   Nose: Nose normal.   Mouth/Throat: Oropharynx is clear and moist.   Eyes: Conjunctivae and EOM are normal. Pupils are equal, round, and reactive to light.   Neck: Neck supple. No JVD present.   Normal range  of motion.  Cardiovascular:  Normal rate, regular rhythm and normal heart sounds.           No murmur heard.  Pulmonary/Chest: Breath sounds normal. No stridor. No respiratory distress. He has no wheezes. He has no rhonchi. He has no rales.   Abdominal: Abdomen is soft. There is no abdominal tenderness.   Musculoskeletal:         General: Tenderness (has tenderness of the anterior talofibular ligament area right and left.  No instability.  No deformity.) and edema (patient has mild edema of both feet.) present. Normal range of motion.      Cervical back: Normal range of motion and neck supple.     Lymphadenopathy:     He has no cervical adenopathy.   Neurological: He is alert and oriented to person, place, and time. He has normal strength. No cranial nerve deficit. GCS score is 15. GCS eye subscore is 4. GCS verbal subscore is 5. GCS motor subscore is 6.   Skin: Skin is warm and dry. Capillary refill takes less than 2 seconds. No rash noted. There is erythema (patient has moderate sunburn of both lower extremities.). No pallor.   Psychiatric: His behavior is normal.       Medical Screening Exam   See Full Note    ED Course   Procedures  Labs Reviewed - No data to display       Imaging Results              X-Ray Ankle Complete Bilateral (Final result)  Result time 05/28/23 16:22:25      Final result by Brandon Lala MD (05/28/23 16:22:25)                   Impression:      No acute radiographic abnormality.    Place of service: [facility]      Electronically signed by: Brandon Lala  Date:    05/28/2023  Time:    16:22               Narrative:    EXAMINATION:  XR ankle complete three views bilateral    CLINICAL HISTORY:  Ankle pain    TECHNIQUE:  AP, oblique and lateral views bilateral ankles    COMPARISON:  None available    FINDINGS:  There is no acute osseous, articular or soft tissue abnormality identified.  Old healed fractures of the 4th and 5th metatarsals is suggested.    The bilateral  ankle joint space appears relatively preserved.                                       Medications   ketorolac injection 30 mg (30 mg Intramuscular Given 5/28/23 1705)     Medical Decision Making:   Initial Assessment:   Initial assessment is bilateral ankle sprain  Differential Diagnosis:   Differential diagnosis includes ankle sprain, dislocation, fracture.  Independently Interpreted Test(s):   I have ordered and independently interpreted X-rays - see summary below.       <> Summary of X-Ray Reading(s): Review of x-rays of both ankles shows no acute fracture or dislocation.  Clinical Tests:   Radiological Study: Ordered and Reviewed  ED Management:  Patient was given IM Toradol for symptomatic relief.  Prescription given for patient to  a ankle gel stirrup type splint at the local medical supply store as there is none available inside the hospital at this time to dispense.  Discharge and follow up instructions given. reviewed radiologist's report for x-ray of the ankle right and left indicates no acute fracture.                       Clinical Impression:   Final diagnoses:  [T14.90XA] Injury  [S93.492A] Sprain of anterior talofibular ligament of left ankle, initial encounter (Primary)  [S93.491A] Sprain of anterior talofibular ligament of right ankle, initial encounter        ED Disposition Condition    Discharge Stable          ED Prescriptions       Medication Sig Dispense Start Date End Date Auth. Provider    naproxen (NAPROSYN) 500 MG tablet Take 1 tablet (500 mg total) by mouth 2 (two) times daily as needed (pain). 14 tablet 5/28/2023 6/4/2023 Dylan So DO          Follow-up Information       Follow up With Specialties Details Why Contact Info    Rose Mary Camacho MD Family Medicine Schedule an appointment as soon as possible for a visit in 2 days To recheck; sooner if worse, not improving, or if any new symptoms. [address]   [place] [address]  [phone]               Dylan  Andrae So DO  05/28/23 0172

## 2023-05-28 NOTE — ED TRIAGE NOTES
Pt c/o bilat lateral ankle pain after falling in a hole 2 days ago. Both feet are swollen. Pt has constrictive banadage wrapped around both feet for support. Pt is ambulatory in er.

## 2023-06-01 NOTE — ADDENDUM NOTE
Encounter addended by: Shelby Vides on: 6/1/2023 11:49 AM   Actions taken: SmartForm saved, Flowsheet accepted

## 2025-02-28 ENCOUNTER — HOSPITAL ENCOUNTER (EMERGENCY)
Facility: HOSPITAL | Age: 40
Discharge: HOME | End: 2025-02-28
Attending: SPECIALIST
Payer: COMMERCIAL

## 2025-02-28 VITALS
DIASTOLIC BLOOD PRESSURE: 86 MMHG | RESPIRATION RATE: 17 BRPM | WEIGHT: 211.63 LBS | HEART RATE: 112 BPM | OXYGEN SATURATION: 96 % | HEIGHT: 71 IN | TEMPERATURE: 98 F | SYSTOLIC BLOOD PRESSURE: 148 MMHG | BODY MASS INDEX: 29.63 KG/M2

## 2025-02-28 DIAGNOSIS — N50.812 PAIN IN LEFT TESTICLE: Primary | ICD-10-CM

## 2025-02-28 DIAGNOSIS — R10.32 LEFT GROIN PAIN: ICD-10-CM

## 2025-02-28 LAB
ALBUMIN SERPL BCP-MCNC: 4.3 G/DL (ref 3.5–5)
ALBUMIN/GLOB SERPL: 1 {RATIO}
ALP SERPL-CCNC: 112 U/L (ref 40–150)
ALT SERPL W P-5'-P-CCNC: 108 U/L
ANION GAP SERPL CALCULATED.3IONS-SCNC: 19 MMOL/L (ref 7–16)
AST SERPL W P-5'-P-CCNC: 186 U/L (ref 5–34)
BASOPHILS # BLD AUTO: 0.05 K/UL (ref 0–0.2)
BASOPHILS NFR BLD AUTO: 1 % (ref 0–1)
BILIRUB SERPL-MCNC: 2.1 MG/DL
BILIRUB UR QL STRIP: NEGATIVE
BUN SERPL-MCNC: 6 MG/DL (ref 9–21)
BUN/CREAT SERPL: 9 (ref 6–20)
CALCIUM SERPL-MCNC: 10 MG/DL (ref 8.4–10.2)
CHLORIDE SERPL-SCNC: 106 MMOL/L (ref 98–107)
CLARITY UR: CLEAR
CO2 SERPL-SCNC: 22 MMOL/L (ref 22–29)
COLOR UR: YELLOW
CREAT SERPL-MCNC: 0.65 MG/DL (ref 0.72–1.25)
DIFFERENTIAL METHOD BLD: ABNORMAL
EGFR (NO RACE VARIABLE) (RUSH/TITUS): 123 ML/MIN/1.73M2
EOSINOPHIL # BLD AUTO: 0.07 K/UL (ref 0–0.5)
EOSINOPHIL NFR BLD AUTO: 1.4 % (ref 1–4)
ERYTHROCYTE [DISTWIDTH] IN BLOOD BY AUTOMATED COUNT: 13.6 % (ref 11.5–14.5)
GLOBULIN SER-MCNC: 4.1 G/DL (ref 2–4)
GLUCOSE SERPL-MCNC: 117 MG/DL (ref 74–100)
GLUCOSE UR STRIP-MCNC: NEGATIVE MG/DL
HCT VFR BLD AUTO: 40.1 % (ref 40–54)
HGB BLD-MCNC: 14.2 G/DL (ref 13.5–18)
IMM GRANULOCYTES # BLD AUTO: 0.02 K/UL (ref 0–0.04)
IMM GRANULOCYTES NFR BLD: 0.4 % (ref 0–0.4)
KETONES UR STRIP-SCNC: NEGATIVE MG/DL
LACTATE SERPL-SCNC: 1.9 MMOL/L (ref 0.5–2.2)
LEUKOCYTE ESTERASE UR QL STRIP: NEGATIVE
LYMPHOCYTES # BLD AUTO: 1.7 K/UL (ref 1–4.8)
LYMPHOCYTES NFR BLD AUTO: 35.1 % (ref 27–41)
MAGNESIUM SERPL-MCNC: 1.6 MG/DL (ref 1.6–2.6)
MCH RBC QN AUTO: 31.8 PG (ref 27–31)
MCHC RBC AUTO-ENTMCNC: 35.4 G/DL (ref 32–36)
MCV RBC AUTO: 89.9 FL (ref 80–96)
MONOCYTES # BLD AUTO: 0.37 K/UL (ref 0–0.8)
MONOCYTES NFR BLD AUTO: 7.6 % (ref 2–6)
MPC BLD CALC-MCNC: 9.8 FL (ref 9.4–12.4)
NEUTROPHILS # BLD AUTO: 2.64 K/UL (ref 1.8–7.7)
NEUTROPHILS NFR BLD AUTO: 54.5 % (ref 53–65)
NITRITE UR QL STRIP: NEGATIVE
NRBC # BLD AUTO: 0 X10E3/UL
NRBC, AUTO (.00): 0 %
PH UR STRIP: 7 PH UNITS
PLATELET # BLD AUTO: 112 K/UL (ref 150–400)
PLATELET MORPHOLOGY: ABNORMAL
POTASSIUM SERPL-SCNC: 3.7 MMOL/L (ref 3.5–5.1)
PROT SERPL-MCNC: 8.4 G/DL (ref 6.4–8.3)
PROT UR QL STRIP: NEGATIVE
RBC # BLD AUTO: 4.46 M/UL (ref 4.6–6.2)
RBC # UR STRIP: NEGATIVE /UL
SODIUM SERPL-SCNC: 143 MMOL/L (ref 136–145)
SP GR UR STRIP: 1.01
UROBILINOGEN UR STRIP-ACNC: 0.2 MG/DL
WBC # BLD AUTO: 4.85 K/UL (ref 4.5–11)

## 2025-02-28 PROCEDURE — 99285 EMERGENCY DEPT VISIT HI MDM: CPT | Mod: 25

## 2025-02-28 PROCEDURE — 96374 THER/PROPH/DIAG INJ IV PUSH: CPT

## 2025-02-28 PROCEDURE — 63600175 PHARM REV CODE 636 W HCPCS: Performed by: SPECIALIST

## 2025-02-28 PROCEDURE — 85025 COMPLETE CBC W/AUTO DIFF WBC: CPT | Performed by: SPECIALIST

## 2025-02-28 PROCEDURE — 96375 TX/PRO/DX INJ NEW DRUG ADDON: CPT

## 2025-02-28 PROCEDURE — 80053 COMPREHEN METABOLIC PANEL: CPT | Performed by: SPECIALIST

## 2025-02-28 PROCEDURE — 83735 ASSAY OF MAGNESIUM: CPT | Performed by: SPECIALIST

## 2025-02-28 PROCEDURE — 99284 EMERGENCY DEPT VISIT MOD MDM: CPT | Mod: ,,, | Performed by: SPECIALIST

## 2025-02-28 PROCEDURE — 96372 THER/PROPH/DIAG INJ SC/IM: CPT | Performed by: SPECIALIST

## 2025-02-28 PROCEDURE — 81003 URINALYSIS AUTO W/O SCOPE: CPT | Performed by: SPECIALIST

## 2025-02-28 PROCEDURE — 83605 ASSAY OF LACTIC ACID: CPT | Performed by: SPECIALIST

## 2025-02-28 PROCEDURE — 25500020 PHARM REV CODE 255: Performed by: SPECIALIST

## 2025-02-28 RX ORDER — HYDROCODONE BITARTRATE AND ACETAMINOPHEN 7.5; 325 MG/1; MG/1
1 TABLET ORAL EVERY 6 HOURS PRN
Qty: 8 TABLET | Refills: 0 | Status: SHIPPED | OUTPATIENT
Start: 2025-02-28 | End: 2025-03-08 | Stop reason: SDUPTHER

## 2025-02-28 RX ORDER — IOPAMIDOL 755 MG/ML
100 INJECTION, SOLUTION INTRAVASCULAR
Status: COMPLETED | OUTPATIENT
Start: 2025-02-28 | End: 2025-02-28

## 2025-02-28 RX ORDER — KETOROLAC TROMETHAMINE 30 MG/ML
60 INJECTION, SOLUTION INTRAMUSCULAR; INTRAVENOUS
Status: COMPLETED | OUTPATIENT
Start: 2025-02-28 | End: 2025-02-28

## 2025-02-28 RX ORDER — PROCHLORPERAZINE EDISYLATE 5 MG/ML
5 INJECTION INTRAMUSCULAR; INTRAVENOUS
Status: COMPLETED | OUTPATIENT
Start: 2025-02-28 | End: 2025-02-28

## 2025-02-28 RX ORDER — HYDROMORPHONE HYDROCHLORIDE 1 MG/ML
1 INJECTION, SOLUTION INTRAMUSCULAR; INTRAVENOUS; SUBCUTANEOUS
Refills: 0 | Status: COMPLETED | OUTPATIENT
Start: 2025-02-28 | End: 2025-02-28

## 2025-02-28 RX ORDER — SULFAMETHOXAZOLE AND TRIMETHOPRIM 800; 160 MG/1; MG/1
1 TABLET ORAL 2 TIMES DAILY
Qty: 20 TABLET | Refills: 0 | Status: SHIPPED | OUTPATIENT
Start: 2025-02-28 | End: 2025-03-10

## 2025-02-28 RX ADMIN — IOPAMIDOL 80 ML: 755 INJECTION, SOLUTION INTRAVENOUS at 10:02

## 2025-02-28 RX ADMIN — KETOROLAC TROMETHAMINE 60 MG: 30 INJECTION, SOLUTION INTRAMUSCULAR at 09:02

## 2025-02-28 RX ADMIN — HYDROMORPHONE HYDROCHLORIDE 1 MG: 1 INJECTION, SOLUTION INTRAMUSCULAR; INTRAVENOUS; SUBCUTANEOUS at 10:02

## 2025-02-28 RX ADMIN — PROCHLORPERAZINE EDISYLATE 5 MG: 5 INJECTION INTRAMUSCULAR; INTRAVENOUS at 10:02

## 2025-03-01 NOTE — ED PROVIDER NOTES
Encounter Date: 2/28/2025       History     Chief Complaint   Patient presents with    Testicle Pain     LT     Patient is a 40 yo wm who comes in after a 2 week history of left testicle pain which now is quite bad.  He has an appointment with Dr Baron ([facility]) doctor on Monday but could not wait until then.  He states that he also has pain in the left groin area.      Review of patient's allergies indicates:  No Known Allergies  Past Medical History:   Diagnosis Date    Depression     GERD (gastroesophageal reflux disease)      Past Surgical History:   Procedure Laterality Date    TONSILLECTOMY       Family History   Problem Relation Name Age of Onset    Cancer Father       Social History[1]  Review of Systems   Genitourinary:  Positive for testicular pain.       Physical Exam     Initial Vitals [02/28/25 2058]   BP Pulse Resp Temp SpO2   (!) 148/86 (!) 112 20 98.2 °F (36.8 °C) 96 %      MAP       --         Physical Exam    Nursing note and vitals reviewed.  Constitutional: He appears well-developed and well-nourished. No distress.   HENT:   Head: Normocephalic and atraumatic. Mouth/Throat: Oropharynx is clear and moist.   Eyes: Conjunctivae are normal. Pupils are equal, round, and reactive to light.   Neck: Neck supple.   Normal range of motion.  Cardiovascular:  Normal rate.           Pulmonary/Chest: Breath sounds normal.   Abdominal:   Left groin tender but no mass or incarcerated hernia present no LAD   Genitourinary:    Penis normal.      Genitourinary Comments: Left testicular pain with palpitation no torsion identified the testicle is low riding no mass     Musculoskeletal:      Cervical back: Normal range of motion and neck supple.     Neurological: He is alert and oriented to person, place, and time. He has normal strength. GCS score is 15. GCS eye subscore is 4. GCS verbal subscore is 5. GCS motor subscore is 6.   Skin: Skin is warm.   Psychiatric: He has a normal mood and affect. His behavior is normal.  Judgment and thought content normal.         Medical Screening Exam   See Full Note    ED Course   Procedures  Labs Reviewed   COMPREHENSIVE METABOLIC PANEL - Abnormal       Result Value    Sodium 143      Potassium 3.7      Chloride 106      CO2 22      Anion Gap 19 (*)     Glucose 117 (*)     BUN 6 (*)     Creatinine 0.65 (*)     BUN/Creatinine Ratio 9      Calcium 10.0      Total Protein 8.4 (*)     Albumin 4.3      Globulin 4.1 (*)     A/G Ratio 1.0      Bilirubin, Total 2.1 (*)     Alk Phos 112       (*)      (*)     eGFR 123     CBC WITH DIFFERENTIAL - Abnormal    WBC 4.85      RBC 4.46 (*)     Hemoglobin 14.2      Hematocrit 40.1      MCV 89.9      MCH 31.8 (*)     MCHC 35.4      RDW 13.6      Platelet Count 112 (*)     MPV 9.8      Neutrophils % 54.5      Lymphocytes % 35.1      Monocytes % 7.6 (*)     Eosinophils % 1.4      Basophils % 1.0      Immature Granulocytes % 0.4      nRBC, Auto 0.0      Neutrophils, Abs 2.64      Lymphocytes, Absolute 1.70      Monocytes, Absolute 0.37      Eosinophils, Absolute 0.07      Basophils, Absolute 0.05      Immature Granulocytes, Absolute 0.02      nRBC, Absolute 0.00      Diff Type Scan Smear     CBC MORPHOLOGY - Abnormal    Platelet Morphology Decreased (*)    MAGNESIUM - Normal    Magnesium 1.6     LACTIC ACID, PLASMA - Normal    Lactic Acid 1.9     CBC W/ AUTO DIFFERENTIAL    Narrative:     The following orders were created for panel order CBC auto differential.  Procedure                               Abnormality         Status                     ---------                               -----------         ------                     CBC with Differential[[id number]]        Abnormal            Final result                 Please view results for these tests on the individual orders.   URINALYSIS, REFLEX TO URINE CULTURE    Color, UA Yellow      Clarity, UA Clear      pH, UA 7.0      Leukocytes, UA Negative      Nitrites, UA Negative      Protein, UA  Negative      Glucose, UA Negative      Ketones, UA Negative      Urobilinogen, UA 0.2      Bilirubin, UA Negative      Blood, UA Negative      Specific Gravity, UA 1.015            Imaging Results              CT Abdomen Pelvis With IV Contrast NO Oral Contrast (In process)                      Medications   ketorolac injection 60 mg (60 mg Intramuscular Given 2/28/25 2128)   iopamidoL (ISOVUE-370) injection 100 mL (80 mLs Intravenous Given 2/28/25 2212)     Medical Decision Making  40 yo male with left testicular pain Patient is not sexually active     Amount and/or Complexity of Data Reviewed  Labs: ordered. Decision-making details documented in ED Course.  Radiology: ordered. Decision-making details documented in ED Course.    Risk  Prescription drug management.                                      Clinical Impression:   Final diagnoses:  [N50.812] Pain in left testicle (Primary)  [R10.32] Left groin pain        ED Disposition Condition    Discharge Stable          ED Prescriptions       Medication Sig Dispense Start Date End Date Auth. Provider    HYDROcodone-acetaminophen (NORCO) 7.5-325 mg per tablet Take 1 tablet by mouth every 6 (six) hours as needed for Pain. 8 tablet 2/28/2025 -- Hazel Canales MD    sulfamethoxazole-trimethoprim 800-160mg (BACTRIM DS) 800-160 mg Tab Take 1 tablet by mouth 2 (two) times daily. for 10 days 20 tablet 2/28/2025 3/10/2025 Hazel Canales MD          Follow-up Information    None              [1]   Social History  Tobacco Use    Smoking status: Former     Types: Vaping with nicotine, Cigarettes    Smokeless tobacco: Never   Substance Use Topics    Alcohol use: Yes    Drug use: Not Currently        Hazel Canales MD  02/28/25 2210

## 2025-03-01 NOTE — DISCHARGE INSTRUCTIONS
Follow up with urology They will call you on Monday to schedule an appointment   Follow up with Dr. Baron as scheduled.  Take pain meds but do not drink alcohol with Norco

## 2025-03-01 NOTE — ED TRIAGE NOTES
PRESENTS WITH C/O 2 WEEK HX OF LT TESTICULAR PAIN. DENIES TRAUMA. DENIES PENILE DISCHARGE. REPORTS HX OF TESTICULAR TORSION @ AGE 12

## 2025-03-08 ENCOUNTER — HOSPITAL ENCOUNTER (EMERGENCY)
Facility: HOSPITAL | Age: 40
Discharge: HOME | End: 2025-03-08
Payer: COMMERCIAL

## 2025-03-08 VITALS
SYSTOLIC BLOOD PRESSURE: 121 MMHG | TEMPERATURE: 98 F | DIASTOLIC BLOOD PRESSURE: 63 MMHG | WEIGHT: 216 LBS | HEIGHT: 71 IN | OXYGEN SATURATION: 97 % | RESPIRATION RATE: 20 BRPM | HEART RATE: 110 BPM | BODY MASS INDEX: 30.24 KG/M2

## 2025-03-08 DIAGNOSIS — N50.812 PAIN IN LEFT TESTICLE: Primary | ICD-10-CM

## 2025-03-08 PROCEDURE — 63600175 PHARM REV CODE 636 W HCPCS: Mod: JZ,TB | Performed by: PEDIATRICS

## 2025-03-08 PROCEDURE — 99284 EMERGENCY DEPT VISIT MOD MDM: CPT | Mod: ,,, | Performed by: PEDIATRICS

## 2025-03-08 PROCEDURE — 99284 EMERGENCY DEPT VISIT MOD MDM: CPT | Mod: 25

## 2025-03-08 PROCEDURE — 96372 THER/PROPH/DIAG INJ SC/IM: CPT | Performed by: PEDIATRICS

## 2025-03-08 PROCEDURE — 25000003 PHARM REV CODE 250: Performed by: PEDIATRICS

## 2025-03-08 RX ORDER — HYDROCODONE BITARTRATE AND ACETAMINOPHEN 7.5; 325 MG/1; MG/1
1 TABLET ORAL EVERY 6 HOURS PRN
Qty: 12 TABLET | Refills: 0 | Status: SHIPPED | OUTPATIENT
Start: 2025-03-08

## 2025-03-08 RX ORDER — KETOROLAC TROMETHAMINE 30 MG/ML
30 INJECTION, SOLUTION INTRAMUSCULAR; INTRAVENOUS
Status: COMPLETED | OUTPATIENT
Start: 2025-03-08 | End: 2025-03-08

## 2025-03-08 RX ORDER — OXYCODONE AND ACETAMINOPHEN 5; 325 MG/1; MG/1
1 TABLET ORAL
Refills: 0 | Status: COMPLETED | OUTPATIENT
Start: 2025-03-08 | End: 2025-03-08

## 2025-03-08 RX ADMIN — OXYCODONE HYDROCHLORIDE AND ACETAMINOPHEN 1 TABLET: 5; 325 TABLET ORAL at 09:03

## 2025-03-08 RX ADMIN — KETOROLAC TROMETHAMINE 30 MG: 30 INJECTION, SOLUTION INTRAMUSCULAR at 09:03

## 2025-03-09 NOTE — ED PROVIDER NOTES
Encounter Date: 3/8/2025       History     Chief Complaint   Patient presents with    Testicle Pain     Patient comes in with continued left testicular pain, x 2 week , had ultrasound at hospital in [place], Contines on atb oral therapy, took hydrocodone last this am. States this started when he was bending down to  shredder cheese of floor, States he is not sexually active. Denies urinary concerns.     39-year-old male with report of last testicular pain.  He was previously seen in the emergency room had a CT of the pelvis which was somewhat unremarkable.  He followed up with his primary care on Monday and had ultrasound of his testicle and reports that it showed no testicular torsion.  He reports he continues to have testicular discomfort.  He reports a couple of days ago swelled up the size of the [place] and he put ice on it with improvement.  He denies any dysuria or urinary symptoms.  He denies being sexually active currently.  He had previous CBC and chemistry done while seen in the emergency room on 02/28 which was normal.      Review of patient's allergies indicates:  No Known Allergies  Past Medical History:   Diagnosis Date    Depression     GERD (gastroesophageal reflux disease)     Legally blind      Past Surgical History:   Procedure Laterality Date    TONSILLECTOMY       Family History   Problem Relation Name Age of Onset    Cancer Father       Social History[1]  Review of Systems   Genitourinary:  Positive for testicular pain. Negative for penile discharge, penile pain, penile swelling and scrotal swelling.   All other systems reviewed and are negative.      Physical Exam     Initial Vitals [03/08/25 2055]   BP Pulse Resp Temp SpO2   (!) 143/85 110 18 98.3 °F (36.8 °C) 99 %      MAP       --         Physical Exam    Nursing note and vitals reviewed.  Constitutional: He appears well-developed and well-nourished. He appears distressed.   Abdominal: Abdomen is soft. He exhibits no distension.  There is no abdominal tenderness. Hernia confirmed negative in the right inguinal area and confirmed negative in the left inguinal area.   Genitourinary:    Penis normal.   Cremasteric reflex is present. Right testis shows no mass and no swelling. Left testis shows tenderness. Left testis shows no mass and no swelling. No phimosis, hypospadias or penile tenderness.    Genitourinary Comments: Tests the goes appear equal in size.  No abnormalities felt on palpation.  Left testicle was tender.       Lymphadenopathy: No inguinal adenopathy noted on the right or left side.   Neurological: He is alert and oriented to person, place, and time. GCS score is 15. GCS eye subscore is 4. GCS verbal subscore is 5. GCS motor subscore is 6.   Skin: Skin is warm and dry. Capillary refill takes less than 2 seconds.   Psychiatric: He has a normal mood and affect. His behavior is normal. Thought content normal.         Medical Screening Exam   See Full Note    ED Course   Procedures  Labs Reviewed - No data to display       Imaging Results    None          Medications   oxyCODONE-acetaminophen 5-325 mg per tablet 1 tablet (has no administration in time range)   ketorolac injection 30 mg (has no administration in time range)     Medical Decision Making  39-year-old male with ongoing testicular pain previously normal CT of abdomen and pelvis and testicular ultrasound.  Does report that the ultrasound was supposed to be reviewed but has not followed up on these results.  No clinical findings of testicular torsion lesions swelling or swollen testicles.    Amount and/or Complexity of Data Reviewed  External Data Reviewed: labs.     Details: Reviewed labs and CT abdomen from 02/28/2025    Risk  Prescription drug management.                                      Clinical Impression:   Final diagnoses:  [N50.812] Pain in left testicle (Primary)        ED Disposition Condition    Discharge Stable          ED Prescriptions       Medication Sig  Dispense Start Date End Date Auth. Provider    HYDROcodone-acetaminophen (NORCO) 7.5-325 mg per tablet Take 1 tablet by mouth every 6 (six) hours as needed for Pain. 12 tablet 3/8/2025 -- Andrae Nava MD          Follow-up Information    None            Andrae Nava MD  03/08/25 4545         [1]   Social History  Tobacco Use    Smoking status: Former     Types: Vaping with nicotine, Cigarettes    Smokeless tobacco: Never   Substance Use Topics    Alcohol use: Yes    Drug use: Not Currently        Andrae Nava MD  03/08/25 1412

## 2025-03-09 NOTE — DISCHARGE INSTRUCTIONS
Advised to saurabh gibson with PCP.    Call and see if there is an earlier apppintmetn with urology or another urology in [place] he can see. Could also try [facility] or [place]

## 2025-03-29 ENCOUNTER — HOSPITAL ENCOUNTER (EMERGENCY)
Facility: HOSPITAL | Age: 40
Discharge: HOME | End: 2025-03-29
Payer: COMMERCIAL

## 2025-03-29 VITALS
TEMPERATURE: 98 F | OXYGEN SATURATION: 98 % | SYSTOLIC BLOOD PRESSURE: 155 MMHG | DIASTOLIC BLOOD PRESSURE: 90 MMHG | BODY MASS INDEX: 30.66 KG/M2 | HEIGHT: 71 IN | RESPIRATION RATE: 14 BRPM | HEART RATE: 107 BPM | WEIGHT: 219 LBS

## 2025-03-29 DIAGNOSIS — N50.812 PAIN IN LEFT TESTICLE: Primary | ICD-10-CM

## 2025-03-29 LAB
BILIRUB UR QL STRIP: NEGATIVE
CLARITY UR: CLEAR
COLOR UR: YELLOW
GLUCOSE UR STRIP-MCNC: NEGATIVE MG/DL
KETONES UR STRIP-SCNC: NEGATIVE MG/DL
LEUKOCYTE ESTERASE UR QL STRIP: NEGATIVE
NITRITE UR QL STRIP: NEGATIVE
PH UR STRIP: 5.5 PH UNITS
PROT UR QL STRIP: NEGATIVE
RBC # UR STRIP: NEGATIVE /UL
SP GR UR STRIP: <=1.005
UROBILINOGEN UR STRIP-ACNC: 1 MG/DL

## 2025-03-29 PROCEDURE — 99284 EMERGENCY DEPT VISIT MOD MDM: CPT | Mod: 25

## 2025-03-29 PROCEDURE — 63600175 PHARM REV CODE 636 W HCPCS: Mod: JZ,TB | Performed by: PEDIATRICS

## 2025-03-29 PROCEDURE — 81003 URINALYSIS AUTO W/O SCOPE: CPT | Performed by: PEDIATRICS

## 2025-03-29 PROCEDURE — 99284 EMERGENCY DEPT VISIT MOD MDM: CPT | Mod: ,,, | Performed by: PEDIATRICS

## 2025-03-29 PROCEDURE — 96372 THER/PROPH/DIAG INJ SC/IM: CPT | Performed by: PEDIATRICS

## 2025-03-29 RX ORDER — KETOROLAC TROMETHAMINE 30 MG/ML
30 INJECTION, SOLUTION INTRAMUSCULAR; INTRAVENOUS
Status: COMPLETED | OUTPATIENT
Start: 2025-03-29 | End: 2025-03-29

## 2025-03-29 RX ADMIN — KETOROLAC TROMETHAMINE 30 MG: 30 INJECTION, SOLUTION INTRAMUSCULAR at 10:03

## 2025-03-30 NOTE — ED PROVIDER NOTES
Encounter Date: 3/29/2025       History     Chief Complaint   Patient presents with    Hematuria     ONSET JUST PTA    Testicle Pain     39-year-old male with chronic testicular pain.  Reports he was seen by Dr. Luque with Urology who told him he believed he had a cyst.  This was done on Wednesday or Thursday.  He reports everything was going well other than the discomfort.  Today he reports that he had blood in his urine.  Reports his left testicle hurts worse since having the blood in his urine.      Review of patient's allergies indicates:  No Known Allergies  Past Medical History:   Diagnosis Date    Depression     GERD (gastroesophageal reflux disease)     Legally blind      Past Surgical History:   Procedure Laterality Date    TONSILLECTOMY       Family History   Problem Relation Name Age of Onset    Cancer Father       Social History[1]  Review of Systems   Genitourinary:  Positive for hematuria, scrotal swelling and testicular pain.   All other systems reviewed and are negative.      Physical Exam     Initial Vitals [03/29/25 2204]   BP Pulse Resp Temp SpO2   (!) 155/90 107 14 98.1 °F (36.7 °C) 98 %      MAP       --         Physical Exam    Nursing note and vitals reviewed.  Constitutional: He appears well-developed and well-nourished. No distress.   Genitourinary:    Penis normal.   Left testis shows swelling and tenderness. No discharge found.    Genitourinary Comments: No gross blood present at the meatus.  Upon milking the penis there was no blood in the meatus.  Left testicle is swollen and tender there is no erythema or bruising on the testicle       Skin: Skin is warm and dry.   Psychiatric: He has a normal mood and affect. His behavior is normal. Judgment and thought content normal.         Medical Screening Exam   See Full Note    ED Course   Procedures  Labs Reviewed   URINALYSIS, REFLEX TO URINE CULTURE       Result Value    Color, UA Yellow      Clarity, UA Clear      pH, UA 5.5      Leukocytes,  UA Negative      Nitrites, UA Negative      Protein, UA Negative      Glucose, UA Negative      Ketones, UA Negative      Urobilinogen, UA 1.0      Bilirubin, UA Negative      Blood, UA Negative      Specific Gravity, UA <=1.005            Imaging Results    None          Medications   ketorolac injection 30 mg (30 mg Intramuscular Given 3/29/25 3980)     Medical Decision Making  39-year-old male with chronic testicular pain reportedly cyst by patient with new onset hematuria    Amount and/or Complexity of Data Reviewed  Labs: ordered. Decision-making details documented in ED Course.  Discussion of management or test interpretation with external provider(s): Pt received toradol for testicular pain. Urine was without blood.     Risk  Prescription drug management.                                      Clinical Impression:   Final diagnoses:  [N50.812] Pain in left testicle (Primary)        ED Disposition Condition    Discharge Stable          ED Prescriptions    None       Follow-up Information    None              [1]   Social History  Tobacco Use    Smoking status: Former     Types: Vaping with nicotine, Cigarettes    Smokeless tobacco: Never   Substance Use Topics    Alcohol use: Yes    Drug use: Not Currently        Andrae Nava MD  03/29/25 1839

## 2025-06-01 ENCOUNTER — HOSPITAL ENCOUNTER (EMERGENCY)
Facility: HOSPITAL | Age: 40
Discharge: ANOTHER HOSPITAL | End: 2025-06-02
Attending: EMERGENCY MEDICINE
Payer: COMMERCIAL

## 2025-06-01 DIAGNOSIS — E80.6 HYPERBILIRUBINEMIA: ICD-10-CM

## 2025-06-01 DIAGNOSIS — N39.0 BACTERIAL URINARY INFECTION: ICD-10-CM

## 2025-06-01 DIAGNOSIS — N17.9 ACUTE KIDNEY INJURY: ICD-10-CM

## 2025-06-01 DIAGNOSIS — A49.9 BACTERIAL URINARY INFECTION: ICD-10-CM

## 2025-06-01 DIAGNOSIS — K76.7 HEPATORENAL SYNDROME: Primary | ICD-10-CM

## 2025-06-01 LAB
ALBUMIN SERPL BCP-MCNC: 2.4 G/DL (ref 3.5–5)
ALBUMIN/GLOB SERPL: 0.6 {RATIO}
ALP SERPL-CCNC: 176 U/L (ref 40–150)
ALT SERPL W P-5'-P-CCNC: 26 U/L
AMORPH PHOS CRY #/AREA URNS LPF: ABNORMAL /LPF
AMYLASE SERPL-CCNC: 47 U/L (ref 25–125)
ANION GAP SERPL CALCULATED.3IONS-SCNC: 21 MMOL/L (ref 7–16)
APTT PPP: 60.8 SECONDS (ref 25.2–37.3)
AST SERPL W P-5'-P-CCNC: 114 U/L (ref 11–45)
BACTERIA #/AREA URNS HPF: ABNORMAL /HPF
BASOPHILS # BLD AUTO: 0.06 K/UL (ref 0–0.2)
BASOPHILS NFR BLD AUTO: 0.7 % (ref 0–1)
BILIRUB DIRECT SERPL-MCNC: >15 MG/DL
BILIRUB SERPL-MCNC: 40.5 MG/DL
BILIRUB UR QL STRIP: ABNORMAL
BUN SERPL-MCNC: 47 MG/DL (ref 9–21)
BUN/CREAT SERPL: 8 (ref 6–20)
CALCIUM SERPL-MCNC: 8.5 MG/DL (ref 8.4–10.2)
CHLORIDE SERPL-SCNC: 92 MMOL/L (ref 98–107)
CLARITY UR: CLEAR
CO2 SERPL-SCNC: 23 MMOL/L (ref 22–29)
COLOR UR: ABNORMAL
CREAT SERPL-MCNC: 5.87 MG/DL (ref 0.72–1.25)
DIFFERENTIAL METHOD BLD: ABNORMAL
EGFR (NO RACE VARIABLE) (RUSH/TITUS): 12 ML/MIN/1.73M2
EOSINOPHIL # BLD AUTO: 0.12 K/UL (ref 0–0.5)
EOSINOPHIL NFR BLD AUTO: 1.3 % (ref 1–4)
ERYTHROCYTE [DISTWIDTH] IN BLOOD BY AUTOMATED COUNT: 19.7 % (ref 11.5–14.5)
GLOBULIN SER-MCNC: 3.9 G/DL (ref 2–4)
GLUCOSE SERPL-MCNC: 99 MG/DL (ref 74–100)
GLUCOSE UR STRIP-MCNC: 100 MG/DL
HCT VFR BLD AUTO: 30.8 % (ref 40–54)
HGB BLD-MCNC: 11.3 G/DL (ref 13.5–18)
IMM GRANULOCYTES # BLD AUTO: 0.14 K/UL (ref 0–0.04)
IMM GRANULOCYTES NFR BLD: 1.5 % (ref 0–0.4)
INR BLD: 1.51
KETONES UR STRIP-SCNC: NEGATIVE MG/DL
LEUKOCYTE ESTERASE UR QL STRIP: NEGATIVE
LIPASE SERPL-CCNC: 130 U/L
LYMPHOCYTES # BLD AUTO: 1.21 K/UL (ref 1–4.8)
LYMPHOCYTES NFR BLD AUTO: 13.3 % (ref 27–41)
MCH RBC QN AUTO: 31.7 PG (ref 27–31)
MCHC RBC AUTO-ENTMCNC: 36.7 G/DL (ref 32–36)
MCV RBC AUTO: 86.3 FL (ref 80–96)
MONOCYTES # BLD AUTO: 1.06 K/UL (ref 0–0.8)
MONOCYTES NFR BLD AUTO: 11.6 % (ref 2–6)
MPC BLD CALC-MCNC: 10.1 FL (ref 9.4–12.4)
MUCOUS THREADS #/AREA URNS HPF: ABNORMAL /HPF
NEUTROPHILS # BLD AUTO: 6.51 K/UL (ref 1.8–7.7)
NEUTROPHILS NFR BLD AUTO: 71.6 % (ref 53–65)
NITRITE UR QL STRIP: NEGATIVE
NRBC # BLD AUTO: 0 X10E3/UL
NRBC, AUTO (.00): 0 %
PH UR STRIP: 6 PH UNITS
PLATELET # BLD AUTO: 135 K/UL (ref 150–400)
POTASSIUM SERPL-SCNC: 3.2 MMOL/L (ref 3.5–5.1)
PROT SERPL-MCNC: 6.3 G/DL (ref 6.4–8.3)
PROT UR QL STRIP: 30
PROTHROMBIN TIME: 18.2 SECONDS (ref 11.7–14.7)
RBC # BLD AUTO: 3.57 M/UL (ref 4.6–6.2)
RBC # UR STRIP: ABNORMAL /UL
RBC #/AREA URNS HPF: ABNORMAL /HPF
RENAL EPI CELLS #/AREA URNS LPF: ABNORMAL /LPF
SODIUM SERPL-SCNC: 133 MMOL/L (ref 136–145)
SP GR UR STRIP: 1.01
SQUAMOUS #/AREA URNS LPF: ABNORMAL /LPF
UROBILINOGEN UR STRIP-ACNC: 1 MG/DL
WBC # BLD AUTO: 9.1 K/UL (ref 4.5–11)
WBC #/AREA URNS HPF: ABNORMAL /HPF

## 2025-06-01 PROCEDURE — 99285 EMERGENCY DEPT VISIT HI MDM: CPT | Mod: ,,, | Performed by: EMERGENCY MEDICINE

## 2025-06-01 PROCEDURE — 85730 THROMBOPLASTIN TIME PARTIAL: CPT | Performed by: EMERGENCY MEDICINE

## 2025-06-01 PROCEDURE — 85025 COMPLETE CBC W/AUTO DIFF WBC: CPT | Performed by: EMERGENCY MEDICINE

## 2025-06-01 PROCEDURE — 81003 URINALYSIS AUTO W/O SCOPE: CPT | Performed by: EMERGENCY MEDICINE

## 2025-06-01 PROCEDURE — 36415 COLL VENOUS BLD VENIPUNCTURE: CPT | Performed by: EMERGENCY MEDICINE

## 2025-06-01 PROCEDURE — 80053 COMPREHEN METABOLIC PANEL: CPT | Performed by: EMERGENCY MEDICINE

## 2025-06-01 PROCEDURE — 96374 THER/PROPH/DIAG INJ IV PUSH: CPT

## 2025-06-01 PROCEDURE — 63600175 PHARM REV CODE 636 W HCPCS: Performed by: EMERGENCY MEDICINE

## 2025-06-01 PROCEDURE — 96375 TX/PRO/DX INJ NEW DRUG ADDON: CPT

## 2025-06-01 PROCEDURE — 83690 ASSAY OF LIPASE: CPT | Performed by: EMERGENCY MEDICINE

## 2025-06-01 PROCEDURE — 96376 TX/PRO/DX INJ SAME DRUG ADON: CPT

## 2025-06-01 PROCEDURE — 85610 PROTHROMBIN TIME: CPT | Performed by: EMERGENCY MEDICINE

## 2025-06-01 PROCEDURE — 82150 ASSAY OF AMYLASE: CPT | Performed by: EMERGENCY MEDICINE

## 2025-06-01 PROCEDURE — 82248 BILIRUBIN DIRECT: CPT | Performed by: EMERGENCY MEDICINE

## 2025-06-01 PROCEDURE — 87086 URINE CULTURE/COLONY COUNT: CPT | Performed by: EMERGENCY MEDICINE

## 2025-06-01 PROCEDURE — 99285 EMERGENCY DEPT VISIT HI MDM: CPT | Mod: 25

## 2025-06-01 RX ORDER — CEFTRIAXONE 1 G/1
1 INJECTION, POWDER, FOR SOLUTION INTRAMUSCULAR; INTRAVENOUS
Status: COMPLETED | OUTPATIENT
Start: 2025-06-01 | End: 2025-06-01

## 2025-06-01 RX ORDER — HYDROMORPHONE HYDROCHLORIDE 1 MG/ML
0.5 INJECTION, SOLUTION INTRAMUSCULAR; INTRAVENOUS; SUBCUTANEOUS
Status: COMPLETED | OUTPATIENT
Start: 2025-06-01 | End: 2025-06-01

## 2025-06-01 RX ORDER — IBUPROFEN 600 MG/1
600 TABLET, FILM COATED ORAL EVERY 6 HOURS PRN
COMMUNITY
Start: 2025-03-04

## 2025-06-01 RX ORDER — HYDROXYZINE HYDROCHLORIDE 25 MG/1
25 TABLET, FILM COATED ORAL 2 TIMES DAILY
COMMUNITY
Start: 2025-04-22

## 2025-06-01 RX ORDER — ONDANSETRON HYDROCHLORIDE 2 MG/ML
4 INJECTION, SOLUTION INTRAVENOUS
Status: COMPLETED | OUTPATIENT
Start: 2025-06-01 | End: 2025-06-01

## 2025-06-01 RX ORDER — CIPROFLOXACIN 250 MG/1
250 TABLET, FILM COATED ORAL 2 TIMES DAILY
COMMUNITY
Start: 2025-04-22 | End: 2025-06-01 | Stop reason: CLARIF

## 2025-06-01 RX ADMIN — ONDANSETRON 4 MG: 2 INJECTION INTRAMUSCULAR; INTRAVENOUS at 11:06

## 2025-06-01 RX ADMIN — HYDROMORPHONE HYDROCHLORIDE 0.5 MG: 1 INJECTION, SOLUTION INTRAMUSCULAR; INTRAVENOUS; SUBCUTANEOUS at 11:06

## 2025-06-01 RX ADMIN — HYDROMORPHONE HYDROCHLORIDE 0.5 MG: 1 INJECTION, SOLUTION INTRAMUSCULAR; INTRAVENOUS; SUBCUTANEOUS at 08:06

## 2025-06-01 RX ADMIN — CEFTRIAXONE 1 G: 1 INJECTION, POWDER, FOR SOLUTION INTRAMUSCULAR; INTRAVENOUS at 10:06

## 2025-06-01 RX ADMIN — ONDANSETRON 4 MG: 2 INJECTION INTRAMUSCULAR; INTRAVENOUS at 08:06

## 2025-06-02 VITALS
RESPIRATION RATE: 19 BRPM | HEART RATE: 97 BPM | HEIGHT: 71 IN | WEIGHT: 216 LBS | DIASTOLIC BLOOD PRESSURE: 54 MMHG | SYSTOLIC BLOOD PRESSURE: 112 MMHG | TEMPERATURE: 98 F | BODY MASS INDEX: 30.24 KG/M2 | OXYGEN SATURATION: 100 %

## 2025-06-02 NOTE — ED NOTES
Called [facility] spoke with Britni PARKS , no additional questions regarding patient.

## 2025-06-02 NOTE — ED NOTES
Patient initially asked that we do not give mother information, Patient now ages we can answer mothers questions and release information to her

## 2025-06-02 NOTE — ED PROVIDER NOTES
"Encounter Date: 6/1/2025       History     Chief Complaint   Patient presents with    Weakness    Abdominal Pain    abdominal distension    Jaundice     Is with right upper quadrant abdominal pain and to a lesser extent left upper quadrant abdominal pain as well as jaundice.  Symptoms started about 2 weeks ago.  Patient reports he stopped drinking alcohol about one-week ago.  He reports drinking about a "1/4 of a gal of vodka daily for the past 2 years", prior to that states he drank moderately.      Review of patient's allergies indicates:  No Known Allergies  Past Medical History:   Diagnosis Date    Depression     GERD (gastroesophageal reflux disease)     Legally blind      Past Surgical History:   Procedure Laterality Date    TONSILLECTOMY       Family History   Problem Relation Name Age of Onset    Cancer Father       Social History[1]  Review of Systems   Constitutional:  Positive for activity change (decreased activity level due to fatigue), appetite change (Decreased appetite) and fatigue. Negative for chills, diaphoresis and fever.   HENT: Negative.  Negative for congestion, sinus pressure, sinus pain, sore throat and trouble swallowing.    Eyes: Negative.    Respiratory: Negative.  Negative for shortness of breath.    Cardiovascular: Negative.  Negative for chest pain.   Gastrointestinal:  Positive for abdominal pain. Negative for abdominal distention, blood in stool, constipation, diarrhea, nausea and vomiting.   Genitourinary: Negative.    Musculoskeletal: Negative.    Skin:  Positive for color change (patient notes worsening yellow discoloration of his skin.). Negative for wound.   Neurological: Negative.    Psychiatric/Behavioral: Negative.     All other systems reviewed and are negative.      Physical Exam     Initial Vitals [06/01/25 1926]   BP Pulse Resp Temp SpO2   129/69 102 18 98.1 °F (36.7 °C) 100 %      MAP       --         Physical Exam    Nursing note and vitals reviewed.  Constitutional: He " appears well-developed and well-nourished. No distress.   HENT:   Right Ear: External ear normal.   Left Ear: External ear normal.   Nose: Nose normal. Mouth/Throat: Oropharynx is clear and moist.   Patient has marked scleral icterus   Eyes: EOM are normal. Pupils are equal, round, and reactive to light. Scleral icterus is present.   Neck: Neck supple. No JVD present.   Normal range of motion.  Cardiovascular:  Normal rate, regular rhythm, normal heart sounds and intact distal pulses.           No murmur heard.  Pulmonary/Chest: Breath sounds normal. No stridor. No respiratory distress. He has no wheezes. He has no rhonchi. He has no rales.   Abdominal: Abdomen is soft. Bowel sounds are normal. He exhibits no distension. There is abdominal tenderness (patient has tenderness diffusely of the right upper quadrant and to a lesser extent the left upper quadrant.).   Musculoskeletal:         General: No tenderness or edema. Normal range of motion.      Cervical back: Normal range of motion and neck supple.     Lymphadenopathy:     He has no cervical adenopathy.   Neurological: He is alert and oriented to person, place, and time. He has normal strength. No cranial nerve deficit. GCS score is 15. GCS eye subscore is 4. GCS verbal subscore is 5. GCS motor subscore is 6.   Skin: Skin is warm and dry.   Psychiatric: He has a normal mood and affect. His behavior is normal.         Medical Screening Exam   See Full Note    ED Course   Procedures  Labs Reviewed   COMPREHENSIVE METABOLIC PANEL - Abnormal       Result Value    Sodium 133 (*)     Potassium 3.2 (*)     Chloride 92 (*)     CO2 23      Anion Gap 21 (*)     Glucose 99      BUN 47 (*)     Creatinine 5.87 (*)     BUN/Creatinine Ratio 8      Calcium 8.5      Total Protein 6.3 (*)     Albumin 2.4 (*)     Globulin 3.9      A/G Ratio 0.6      Bilirubin, Total 40.5 (*)     Alk Phos 176 (*)     ALT 26       (*)     eGFR 12 (*)    URINALYSIS, REFLEX TO URINE CULTURE -  Abnormal    Color, UA Dark Yellow      Clarity, UA Clear      pH, UA 6.0      Leukocytes, UA Negative      Nitrites, UA Negative      Protein, UA 30 (*)     Glucose,  (*)     Ketones, UA Negative      Urobilinogen, UA 1.0      Bilirubin, UA Large (*)     Blood, UA Small (*)     Specific [place], UA 1.010     LIPASE - Abnormal    Lipase 130 (*)    CBC WITH DIFFERENTIAL - Abnormal    WBC 9.10      RBC 3.57 (*)     Hemoglobin 11.3 (*)     Hematocrit 30.8 (*)     MCV 86.3      MCH 31.7 (*)     MCHC 36.7 (*)     RDW 19.7 (*)     Platelet Count 135 (*)     MPV 10.1      Neutrophils % 71.6 (*)     Lymphocytes % 13.3 (*)     Monocytes % 11.6 (*)     Eosinophils % 1.3      Basophils % 0.7      Immature Granulocytes % 1.5 (*)     nRBC, Auto 0.0      Neutrophils, Abs 6.51      Lymphocytes, Absolute 1.21      Monocytes, Absolute 1.06 (*)     Eosinophils, Absolute 0.12      Basophils, Absolute 0.06      Immature Granulocytes, Absolute 0.14 (*)     nRBC, Absolute 0.00      Diff Type Auto     URINALYSIS, MICROSCOPIC - Abnormal    WBC, UA 0-5      RBC, UA 3-5 (*)     Bacteria, UA Moderate (*)     Squamous Epithelial Cells, UA Moderate (*)     Renal Epithelial Cells, UA Few (*)     Mucus, UA Few (*)     Amorphous Crystals, UA Moderate (*)    BILIRUBIN, DIRECT - Abnormal    Bilirubin, Direct >15.0 (*)    APTT - Abnormal    PTT 60.8 (*)    PROTIME-INR - Abnormal    PT 18.2 (*)     INR 1.51     AMYLASE - Normal    Amylase 47     CULTURE, URINE   CBC W/ AUTO DIFFERENTIAL    Narrative:     The following orders were created for panel order CBC auto differential.  Procedure                               Abnormality         Status                     ---------                               -----------         ------                     CBC with Differential[[id number]]       Abnormal            Final result                 Please view results for these tests on the individual orders.          Imaging Results              CT Abdomen  Pelvis  Without Contrast (Final result)  Result time 06/01/25 23:58:15      Final result by Gerald Omer DO (06/01/25 23:58:15)                   Impression:      No acute abnormality of the abdomen or pelvis.    Hepatosplenomegaly and hepatic steatosis.      Electronically signed by: Gerald Omer  Date:    06/01/2025  Time:    23:58               Narrative:    EXAMINATION:  CT ABDOMEN PELVIS WITHOUT CONTRAST    CLINICAL HISTORY:  Abdominal pain, acute, nonlocalized;    TECHNIQUE:  Multiplanar images were obtained of the abdomen and pelvis from the hemidiaphragms through the symphysis pubis without intravenous contrast.    COMPARISON:  CT abdomen and pelvis from 02/28/2025.    FINDINGS:  Lung Bases: Mild atelectasis noted in the right lower lobe.    Heart: Heart size is normal.  No pericardial effusion.    Liver: The liver is enlarged.  There is diffuse hypoattenuation of the hepatic parenchyma, compatible with hepatic steatosis.  There are no focal hepatic lesions.    Biliary tract: No intrahepatic or extrahepatic biliary ductal dilatation.    Gallbladder: No radiodense gallstone. No wall thickening or pericholecystic fluid.    Pancreas: Normal. No pancreatic ductal dilatation.    Spleen: The spleen is enlarged.  There are no focal splenic lesions.    Adrenals: Normal.    Kidneys and urinary collecting systems: Small simple cyst in the right kidney midpole noted.  No hydronephrosis or urolithiasis.    Lymph nodes: None enlarged.    Stomach and bowel: The stomach is normal.  Loops of small and large bowel are normal in caliber without evidence for inflammation or obstruction.  The appendix is normal.  There is colonic diverticulosis without acute diverticulitis.    Peritoneum and mesentery: No ascites or free intraperitoneal air. No abdominal fluid collection.    Vasculature: Normal.    Urinary bladder: Normal.    Reproductive organs: The prostate is unremarkable.    Body wall: No  abnormality.    Musculoskeletal: No aggressive osseous lesion.                                       Medications   HYDROmorphone injection 0.5 mg (0.5 mg Intravenous Given 6/1/25 2054)   ondansetron injection 4 mg (4 mg Intravenous Given 6/1/25 2055)   cefTRIAXone injection 1 g (1 g Intravenous Given 6/1/25 2253)   HYDROmorphone injection 0.5 mg (0.5 mg Intravenous Given 6/1/25 2308)   ondansetron injection 4 mg (4 mg Intravenous Given 6/1/25 2308)     Medical Decision Making  Differential diagnosis includes cirrhosis, end-stage liver failure, obstructive jaundice, pancreatitis.    Patient has markedly elevated bilirubin, and has elevated BUN and creatinine as well suggestive of hepatorenal syndrome.    Recommend transfer to higher level of care.  Patient requests to go to the [facility].        Amount and/or Complexity of Data Reviewed  External Data Reviewed: labs.     Details: Previous BUN and creatinine in February 2025 were normal.  Total bilirubin in February 2025 was slightly elevated at 2.1.  Labs: ordered. Decision-making details documented in ED Course.     Details: Ammonia level was ordered, could not be processed due to extremely high level of bilirubin.  Radiology: ordered.  Discussion of management or test interpretation with external provider(s): Patient accepted by Dr. Paul at [facility] in [facility] via [facility]    Risk  Prescription drug management.               ED Course as of 06/02/25 0429   Sun Jun 01, 202501, 2025 2044 CBC auto differential(!)  CBC shows normal white blood cell count, hemoglobin 11.3 with a hematocrit 30.8.  Platelet count 135.  71% neutrophils, 13% lymphocytes. [LM]   2045 Comprehensive metabolic panel(!)  CMP shows sodium 133 with potassium 3.2.  Normal serum CO2.  Anion gap increased at 21.  Glucose is normal at 99.  BUN is 47 with creatinine 5.87.  BUN creatinine ratio of 8 with a estimated GFR 12.  Albumin is low at 2.4.  Patient has markedly elevated bilirubin at 40.5,  alkaline phosphatase increased at 176, normal ALT of 26, elevated AST of 114. [LM]   2045 Lipase(!)  Lipase is slightly elevated at 130. [LM]   2045 Amylase  Amylase is normal. [LM]   2045 Urinalysis, Reflex to Urine Culture(!)  Urinalysis shows dark yellow clear urine with proteinuria, glucosuria, bilirubin area, and small blood.  Nitrite and leukocyte esterase negative. [LM]   2046 Urinalysis, Microscopic(!)  Microscopic urinalysis shows 0-5 WBCs with 3-5 RBCs and moderate bacteria. [LM]   2120 Bilirubin, Direct(!)  Direct bilirubin is greater than 15. [LM]   2350 APTT(!)  PTT is increased at 60 point [LM]   2351 Protime-INR(!)  PT is increased at 18.2, with an INR of 1.51. [LM]      ED Course User Index  [LM] Dylan So DO                           Clinical Impression:   Final diagnoses:  [K76.7] Hepatorenal syndrome (Primary)  [E80.6] Hyperbilirubinemia  [N17.9] Acute kidney injury  [N39.0, A49.9] Bacterial urinary infection        ED Disposition Condition    Transfer to Another Facility Serious                    [1]   Social History  Tobacco Use    Smoking status: Former     Types: Vaping with nicotine, Cigarettes    Smokeless tobacco: Never   Substance Use Topics    Alcohol use: Not Currently    Drug use: Not Currently        Dylan So DO  06/02/25 0429

## 2025-06-04 LAB — UA COMPLETE W REFLEX CULTURE PNL UR: NO GROWTH

## 2025-06-16 ENCOUNTER — PATIENT OUTREACH (OUTPATIENT)
Facility: OTHER | Age: 40
End: 2025-06-16
Payer: COMMERCIAL

## 2025-06-16 NOTE — PROGRESS NOTES
6/16/25  Ed navigator first enrollment outreach attempt-unable to contact patient  Ed navigator left message to return call to [phone]  Theresa Cole [facility]  Lpn-Ed Navigator  Ph# [phone]      6/18/25  Ed navigator second enrollment outreach attempt-unable to contact patient  Telephone message-the number you have dialed is not available at the tone leave a message when you have finished hang up and try your call again  Ed navigator left message to return call to [phone]  Ed navigator will close chart at this time.  Theresa Cole [facility]  Lpn-Ed Navigator  Ph# [phone]

## 2025-07-03 ENCOUNTER — HOSPITAL ENCOUNTER (EMERGENCY)
Facility: HOSPITAL | Age: 40
Discharge: ANOTHER HOSPITAL | End: 2025-07-03
Attending: INTERNAL MEDICINE
Payer: COMMERCIAL

## 2025-07-03 VITALS
SYSTOLIC BLOOD PRESSURE: 139 MMHG | RESPIRATION RATE: 20 BRPM | BODY MASS INDEX: 30.89 KG/M2 | HEIGHT: 71 IN | TEMPERATURE: 99 F | WEIGHT: 220.63 LBS | DIASTOLIC BLOOD PRESSURE: 59 MMHG | HEART RATE: 82 BPM | OXYGEN SATURATION: 99 %

## 2025-07-03 DIAGNOSIS — R10.9 ABDOMINAL PAIN: ICD-10-CM

## 2025-07-03 DIAGNOSIS — K65.2 PERITONITIS, SPONTANEOUS BACTERIAL: Primary | ICD-10-CM

## 2025-07-03 DIAGNOSIS — D64.9 SEVERE ANEMIA: ICD-10-CM

## 2025-07-03 DIAGNOSIS — K92.2 GASTROINTESTINAL HEMORRHAGE, UNSPECIFIED GASTROINTESTINAL HEMORRHAGE TYPE: ICD-10-CM

## 2025-07-03 DIAGNOSIS — R10.9 ABDOMINAL PAIN WITH JAUNDICE: ICD-10-CM

## 2025-07-03 DIAGNOSIS — K72.00 ACUTE LIVER FAILURE WITHOUT HEPATIC COMA: ICD-10-CM

## 2025-07-03 DIAGNOSIS — R17 ABDOMINAL PAIN WITH JAUNDICE: ICD-10-CM

## 2025-07-03 LAB
ALBUMIN SERPL BCP-MCNC: 2.1 G/DL (ref 3.5–5)
ALBUMIN/GLOB SERPL: 0.8 {RATIO}
ALP SERPL-CCNC: 173 U/L (ref 40–150)
ALT SERPL W P-5'-P-CCNC: 92 U/L
AMMONIA PLAS-SCNC: 67 ΜMOL/L (ref 18–72)
ANION GAP SERPL CALCULATED.3IONS-SCNC: 14 MMOL/L (ref 7–16)
APTT PPP: 34.3 SECONDS (ref 25.2–37.3)
AST SERPL W P-5'-P-CCNC: 107 U/L (ref 11–45)
BASOPHILS # BLD AUTO: 0.01 K/UL (ref 0–0.2)
BASOPHILS NFR BLD AUTO: 0.1 % (ref 0–1)
BILIRUB SERPL-MCNC: 16.1 MG/DL
BILIRUB UR QL STRIP: ABNORMAL
BUN SERPL-MCNC: 33 MG/DL (ref 9–21)
BUN/CREAT SERPL: 35 (ref 6–20)
CALCIUM SERPL-MCNC: 7.8 MG/DL (ref 8.4–10.2)
CHLORIDE SERPL-SCNC: 108 MMOL/L (ref 98–107)
CLARITY UR: CLEAR
CO2 SERPL-SCNC: 19 MMOL/L (ref 22–29)
COLOR UR: ABNORMAL
CREAT SERPL-MCNC: 0.94 MG/DL (ref 0.72–1.25)
DIFFERENTIAL METHOD BLD: ABNORMAL
EGFR (NO RACE VARIABLE) (RUSH/TITUS): 106 ML/MIN/1.73M2
EOSINOPHIL # BLD AUTO: 0.02 K/UL (ref 0–0.5)
EOSINOPHIL NFR BLD AUTO: 0.2 % (ref 1–4)
ERYTHROCYTE [DISTWIDTH] IN BLOOD BY AUTOMATED COUNT: 18.2 % (ref 11.5–14.5)
ETHANOL SERPL-MCNC: <10 MG/DL
GLOBULIN SER-MCNC: 2.7 G/DL (ref 2–4)
GLUCOSE SERPL-MCNC: 169 MG/DL (ref 74–100)
GLUCOSE UR STRIP-MCNC: 100 MG/DL
HCT VFR BLD AUTO: 23.7 % (ref 40–54)
HGB BLD-MCNC: 8.4 G/DL (ref 13.5–18)
IMM GRANULOCYTES # BLD AUTO: 0.26 K/UL (ref 0–0.04)
IMM GRANULOCYTES NFR BLD: 2.3 % (ref 0–0.4)
INR BLD: 1.48
KETONES UR STRIP-SCNC: NEGATIVE MG/DL
LEUKOCYTE ESTERASE UR QL STRIP: NEGATIVE
LIPASE SERPL-CCNC: 134 U/L
LYMPHOCYTES # BLD AUTO: 0.78 K/UL (ref 1–4.8)
LYMPHOCYTES NFR BLD AUTO: 6.8 % (ref 27–41)
MCH RBC QN AUTO: 31.5 PG (ref 27–31)
MCHC RBC AUTO-ENTMCNC: 35.4 G/DL (ref 32–36)
MCV RBC AUTO: 88.8 FL (ref 80–96)
MONOCYTES # BLD AUTO: 0.43 K/UL (ref 0–0.8)
MONOCYTES NFR BLD AUTO: 3.8 % (ref 2–6)
MPC BLD CALC-MCNC: 10.1 FL (ref 9.4–12.4)
NEUTROPHILS # BLD AUTO: 9.94 K/UL (ref 1.8–7.7)
NEUTROPHILS NFR BLD AUTO: 86.8 % (ref 53–65)
NITRITE UR QL STRIP: NEGATIVE
NRBC # BLD AUTO: 0 X10E3/UL
NRBC, AUTO (.00): 0 %
OCCULT BLOOD: POSITIVE
PH UR STRIP: 6.5 PH UNITS
PLATELET # BLD AUTO: 98 K/UL (ref 150–400)
POTASSIUM SERPL-SCNC: 4.4 MMOL/L (ref 3.5–5.1)
PROT SERPL-MCNC: 4.8 G/DL (ref 6.4–8.3)
PROT UR QL STRIP: NEGATIVE
PROTHROMBIN TIME: 17.9 SECONDS (ref 11.7–14.7)
RBC # BLD AUTO: 2.67 M/UL (ref 4.6–6.2)
RBC # UR STRIP: NEGATIVE /UL
SODIUM SERPL-SCNC: 137 MMOL/L (ref 136–145)
SP GR UR STRIP: 1.01
UROBILINOGEN UR STRIP-ACNC: >=8 MG/DL
WBC # BLD AUTO: 11.44 K/UL (ref 4.5–11)

## 2025-07-03 PROCEDURE — 83690 ASSAY OF LIPASE: CPT | Performed by: INTERNAL MEDICINE

## 2025-07-03 PROCEDURE — 81003 URINALYSIS AUTO W/O SCOPE: CPT | Performed by: INTERNAL MEDICINE

## 2025-07-03 PROCEDURE — 82140 ASSAY OF AMMONIA: CPT | Performed by: INTERNAL MEDICINE

## 2025-07-03 PROCEDURE — 82077 ASSAY SPEC XCP UR&BREATH IA: CPT | Performed by: INTERNAL MEDICINE

## 2025-07-03 PROCEDURE — 85610 PROTHROMBIN TIME: CPT | Performed by: INTERNAL MEDICINE

## 2025-07-03 PROCEDURE — 93005 ELECTROCARDIOGRAM TRACING: CPT

## 2025-07-03 PROCEDURE — 63600175 PHARM REV CODE 636 W HCPCS: Mod: JZ,TB | Performed by: INTERNAL MEDICINE

## 2025-07-03 PROCEDURE — 82272 OCCULT BLD FECES 1-3 TESTS: CPT | Performed by: INTERNAL MEDICINE

## 2025-07-03 PROCEDURE — 85730 THROMBOPLASTIN TIME PARTIAL: CPT | Performed by: INTERNAL MEDICINE

## 2025-07-03 PROCEDURE — 36415 COLL VENOUS BLD VENIPUNCTURE: CPT | Performed by: INTERNAL MEDICINE

## 2025-07-03 PROCEDURE — 25000003 PHARM REV CODE 250: Performed by: INTERNAL MEDICINE

## 2025-07-03 PROCEDURE — 80053 COMPREHEN METABOLIC PANEL: CPT | Performed by: INTERNAL MEDICINE

## 2025-07-03 PROCEDURE — 85025 COMPLETE CBC W/AUTO DIFF WBC: CPT | Performed by: INTERNAL MEDICINE

## 2025-07-03 RX ORDER — PANTOPRAZOLE SODIUM 40 MG/10ML
40 INJECTION, POWDER, LYOPHILIZED, FOR SOLUTION INTRAVENOUS
Status: COMPLETED | OUTPATIENT
Start: 2025-07-03 | End: 2025-07-03

## 2025-07-03 RX ORDER — HYDROMORPHONE HYDROCHLORIDE 1 MG/ML
0.5 INJECTION, SOLUTION INTRAMUSCULAR; INTRAVENOUS; SUBCUTANEOUS
Refills: 0 | Status: COMPLETED | OUTPATIENT
Start: 2025-07-03 | End: 2025-07-03

## 2025-07-03 RX ORDER — SODIUM BICARBONATE 650 MG/1
1300 TABLET ORAL
COMMUNITY
Start: 2025-06-26 | End: 2025-07-26

## 2025-07-03 RX ORDER — PREDNISOLONE ORAL SOLUTION 15 MG/5ML
SOLUTION ORAL
COMMUNITY

## 2025-07-03 RX ORDER — KETOROLAC TROMETHAMINE 15 MG/ML
15 INJECTION, SOLUTION INTRAMUSCULAR; INTRAVENOUS
Status: COMPLETED | OUTPATIENT
Start: 2025-07-03 | End: 2025-07-03

## 2025-07-03 RX ORDER — ONDANSETRON HYDROCHLORIDE 2 MG/ML
4 INJECTION, SOLUTION INTRAVENOUS
Status: COMPLETED | OUTPATIENT
Start: 2025-07-03 | End: 2025-07-03

## 2025-07-03 RX ORDER — OXYCODONE HYDROCHLORIDE 5 MG/1
5 TABLET ORAL EVERY 8 HOURS PRN
COMMUNITY
Start: 2025-06-11

## 2025-07-03 RX ORDER — ZINC GLUCONATE 50 MG
50 TABLET ORAL
COMMUNITY
Start: 2025-06-26 | End: 2025-07-26

## 2025-07-03 RX ORDER — HYDROXYZINE HYDROCHLORIDE 25 MG/1
25 TABLET, FILM COATED ORAL 2 TIMES DAILY PRN
COMMUNITY
Start: 2025-06-26 | End: 2025-07-26

## 2025-07-03 RX ORDER — ONDANSETRON 4 MG/1
4 TABLET, ORALLY DISINTEGRATING ORAL EVERY 8 HOURS PRN
COMMUNITY
Start: 2025-06-26 | End: 2025-07-10

## 2025-07-03 RX ORDER — LACTULOSE 10 G/15ML
10 SOLUTION ORAL; RECTAL
COMMUNITY
Start: 2025-06-12 | End: 2025-08-11

## 2025-07-03 RX ADMIN — HYDROMORPHONE HYDROCHLORIDE 0.5 MG: 1 INJECTION, SOLUTION INTRAMUSCULAR; INTRAVENOUS; SUBCUTANEOUS at 09:07

## 2025-07-03 RX ADMIN — ONDANSETRON 4 MG: 2 INJECTION INTRAMUSCULAR; INTRAVENOUS at 06:07

## 2025-07-03 RX ADMIN — PANTOPRAZOLE SODIUM 40 MG: 40 INJECTION, POWDER, FOR SOLUTION INTRAVENOUS at 07:07

## 2025-07-03 RX ADMIN — KETOROLAC TROMETHAMINE 15 MG: 15 INJECTION, SOLUTION INTRAMUSCULAR; INTRAVENOUS at 07:07

## 2025-07-03 RX ADMIN — PIPERACILLIN SODIUM AND TAZOBACTAM SODIUM 4.5 G: 4; .5 INJECTION, POWDER, LYOPHILIZED, FOR SOLUTION INTRAVENOUS at 08:07

## 2025-07-03 NOTE — ED PROVIDER NOTES
Encounter Date: 7/3/2025       History     Chief Complaint   Patient presents with    Abdominal Pain    Nausea    edema      Patient presents with the abdominal pain generalized weakness shaking trembling for the last several days.  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  He was seen by his primary care provider Dr. Baron, he was on prednisone pills and will change liquid prednisolone.  He states he feel like he has fluid in his abdomen, there is some pain but no GI bleeding.  The patient has said his bilirubin was 44 on transfer now is 17 last week.    Hospital Course:  Mr. Rangel presented with jaundice and abdominal pain and was admitted with alcoholic hepatitis and acute renal failure. Nephrology and Hepatology assisted. MADDI suspected to be from bilirubin cast nephropathy vs ATN (ibuprofen use) vs HRS. He was treated with an albumin challenge, which did not show improvement initially. However, the injury plateau'd and subsequently improved slowly. He was started on prednisolone 40 mg daily for alcohol-induced hepatitis and his 7 day Lille score showed improvement. He was prescribed 4 wks of steroid therapy and setup with outpatient Hepatology follow-up. He was also instructed to follow-up with his PCP in 2 weeks for repeat lab checks to ensure improvement and adjust vs discontinue the sodium bicarbonate. He reached maximum benefit of inpatient care.   Given improvement, the patient was discharged in a stable/improved condition to continue care in the outpatient setting.             Review of patient's allergies indicates:  No Known Allergies  Past Medical History:   Diagnosis Date    Depression     GERD (gastroesophageal reflux disease)     Legally blind     Liver failure      Past Surgical History:   Procedure Laterality Date    TONSILLECTOMY       Family History   Problem  Relation Name Age of Onset    Cancer Father       Social History[1]  Review of Systems   Constitutional:  Negative for fever.   HENT:  Negative for sore throat.    Respiratory:  Negative for shortness of breath.    Cardiovascular:  Negative for chest pain.   Gastrointestinal:  Negative for nausea.   Genitourinary:  Negative for dysuria.   Musculoskeletal:  Negative for back pain.   Skin:  Negative for rash.   Neurological:  Negative for weakness.   Hematological:  Does not bruise/bleed easily.       Physical Exam     Initial Vitals [07/03/25 1800]   BP Pulse Resp Temp SpO2   (!) 145/73 88 (!) 21 99 °F (37.2 °C) 99 %      MAP       --         Physical Exam    Nursing note and vitals reviewed.  Constitutional: He appears well-developed.   HENT:   Head: Normocephalic.   Eyes: Pupils are equal, round, and reactive to light. Right conjunctiva is injected. Left conjunctiva is injected. Scleral icterus is present.   Neck:   Normal range of motion.  Cardiovascular:  Normal rate, regular rhythm, normal heart sounds and normal pulses.           Pulmonary/Chest: No respiratory distress. He has rhonchi.   Abdominal: Abdomen is soft and protuberant. He exhibits distension and fluid wave. Bowel sounds are decreased. There is abdominal tenderness. There is guarding. There is no rebound.   Musculoskeletal:         General: Normal range of motion.      Cervical back: Normal range of motion.     Neurological: He is alert. He has normal strength and normal reflexes. No cranial nerve deficit. GCS eye subscore is 4. GCS verbal subscore is 5. GCS motor subscore is 6.   Skin: Skin is warm.         Medical Screening Exam   See Full Note    ED Course   Procedures  Labs Reviewed   COMPREHENSIVE METABOLIC PANEL - Abnormal       Result Value    Sodium 137      Potassium 4.4      Chloride 108 (*)     CO2 19 (*)     Anion Gap 14      Glucose 169 (*)     BUN 33 (*)     Creatinine 0.94      BUN/Creatinine Ratio 35 (*)     Calcium 7.8 (*)     Total  Protein 4.8 (*)     Albumin 2.1 (*)     Globulin 2.7      A/G Ratio 0.8      Bilirubin, Total 16.1 (*)     Alk Phos 173 (*)     ALT 92 (*)      (*)     eGFR 106     LIPASE - Abnormal    Lipase 134 (*)    URINALYSIS, REFLEX TO URINE CULTURE - Abnormal    Color, UA Orange (*)     Clarity, UA Clear      pH, UA 6.5      Leukocytes, UA Negative      Nitrites, UA Negative      Protein, UA Negative      Glucose,  (*)     Ketones, UA Negative      Urobilinogen, UA >=8.0 (*)     Bilirubin, UA Large (*)     Blood, UA Negative      Specific Gravity, UA 1.015     CBC WITH DIFFERENTIAL - Abnormal    WBC 11.44 (*)     RBC 2.67 (*)     Hemoglobin 8.4 (*)     Hematocrit 23.7 (*)     MCV 88.8      MCH 31.5 (*)     MCHC 35.4      RDW 18.2 (*)     Platelet Count 98 (*)     MPV 10.1      Neutrophils % 86.8 (*)     Lymphocytes % 6.8 (*)     Monocytes % 3.8      Eosinophils % 0.2 (*)     Basophils % 0.1      Immature Granulocytes % 2.3 (*)     nRBC, Auto 0.0      Neutrophils, Abs 9.94 (*)     Lymphocytes, Absolute 0.78 (*)     Monocytes, Absolute 0.43      Eosinophils, Absolute 0.02      Basophils, Absolute 0.01      Immature Granulocytes, Absolute 0.26 (*)     nRBC, Absolute 0.00      Diff Type Auto     OCCULT BLOOD X 1, STOOL - Abnormal    Occult Blood Positive (*)    PROTIME-INR - Abnormal    PT 17.9 (*)     INR 1.48     AMMONIA - Normal    Ammonia 67     ALCOHOL,MEDICAL (ETHANOL) - Normal    Ethanol <10     APTT - Normal    PTT 34.3     CBC W/ AUTO DIFFERENTIAL    Narrative:     The following orders were created for panel order CBC W/ AUTO DIFFERENTIAL.  Procedure                               Abnormality         Status                     ---------                               -----------         ------                     CBC with Differential[[id number]]       Abnormal            Final result                 Please view results for these tests on the individual orders.     EKG Readings: (Independently Interpreted)    Initial Reading: No STEMI. Rhythm: Normal Sinus Rhythm. Heart Rate: Eighty-seven. Ectopy: No Ectopy. Axis: Left Axis Deviation. Clinical Impression: Normal Sinus Rhythm   Normal sinus rhythm heart rate of 87 and no acute ischemic changes no old EKG       Imaging Results              CT Abdomen Pelvis  Without Contrast (Final result)  Result time 07/03/25 19:32:36      Final result by Homero Valencia MD (07/03/25 19:32:36)                   Impression:      Persistent hepatosplenomegaly.    New 3rd spacing within the subcutaneous region mesentery and peritoneal cavity with ascites.    Peritonitis would be difficult to exclude radiographically.  Correlation needed.    No evidence of bowel obstruction.    Cardiac chamber features of anemia.    Improved if not resolved atelectasis in the right lung base.      Electronically signed by: Homero Valencia  Date:    07/03/2025  Time:    19:32               Narrative:    EXAMINATION:  CT ABDOMEN PELVIS WITHOUT CONTRAST    CLINICAL HISTORY:  Abdominal pain, acute, nonlocalized;    TECHNIQUE:  Low dose axial images, sagittal and coronal reformations were obtained from the lung bases to the pubic symphysis.  No oral contrast.    COMPARISON:  06/01/2025 CT abdomen and pelvis    FINDINGS:  Marked hepatosplenomegaly remains.  New pericapsular fluid is noted with ascites in the right upper quadrant and in the pelvis.    The kidneys, aorta, vena cava, pancreas, gallbladder and adrenal glands appear stable.  Exophytic cyst off of the lower pole of the right kidney unchanged.  Pancreas stable.    There is some mild new increased density throughout the mesenteric leaves and fat centrally in the abdomen.  This may be due to 3rd spacing edema.    No new mass or for free air.  No pneumatosis.    Bases of the lungs are clear.  Cardiac silhouette is not enlarged with cardiac blood pool low-density.                                       Medications   HYDROmorphone injection 0.5 mg (has  no administration in time range)   ondansetron injection 4 mg (4 mg Intravenous Given 7/3/25 1823)   ketorolac injection 15 mg (15 mg Intravenous Given 7/3/25 1907)   piperacillin-tazobactam (ZOSYN) 4.5 g in D5W 100 mL IVPB (MB+) (0 g Intravenous Stopped 7/3/25 2036)   pantoprazole injection 40 mg (40 mg Intravenous Given 7/3/25 1959)     Medical Decision Making  Patient with a better renal syndrome, elevated bilirubin alcohol liver disease with the abdominal pain, possible ascites rule out obstruction, ileus, gastroenteritis, bacterial viral infection.    Amount and/or Complexity of Data Reviewed  Labs: ordered. Decision-making details documented in ED Course.  Radiology: ordered.  Discussion of management or test interpretation with external provider(s): CT scan shows that has now marked ascites with a edema and fluid can not rule out peritonitis, white count is elevated does have a fever was started Zosyn antibiotics.  Bilirubin has decreased but liver function tests are still elevated.  Patient also has significant drop with a hematocrit with a heme-positive stools all consistent with a possible GI bleed as well.  At this point patient needs high level care come will initiate transfer back to [facility] to hepatology, GI, and further workup evaluation with a ascites.    Risk  Prescription drug management.               ED Course as of 07/03/25 2112   u Jul 03, 2025   1832 CBC W/ AUTO DIFFERENTIAL(!) [PW]   1833 CBC W/ AUTO DIFFERENTIAL(!) [PW]   1833 Patient with significant decrease hemoglobin hematocrit, platelet count 90,000 rule out GI bleed [PW]   1852 Lipase(!) [PW]   1852 Lipase(!): 134 [PW]   1852 Comp. Metabolic Panel(!) [PW]   1857 Alcohol, Serum: <10 [PW]   1913 Ammonia: 67 [PW]   1916 Occult Blood(!): Positive [PW]   1916 Urinalysis, Reflex to Urine Culture Urine, Clean Catch(!) [PW]   1952 Occult Blood(!): Positive [PW]   1952 PT(!): 17.9 [PW]   1959 Patient accepted to [facility] in  [facility] hospitalist service [PW]   2111 Helicopter here [PW]      ED Course User Index  [PW] Shaq Phillips MD                           Clinical Impression:   Final diagnoses:  [R10.9] Abdominal pain  [K65.2] Peritonitis, spontaneous bacterial (Primary)  [K72.00] Acute liver failure without hepatic coma  [K92.2] Gastrointestinal hemorrhage, unspecified gastrointestinal hemorrhage type  [D64.9] Severe anemia  [R10.9, R17] Abdominal pain with jaundice        ED Disposition Condition    Transfer to Another Facility Serious                    [1]   Social History  Tobacco Use    Smoking status: Every Day     Types: Vaping with nicotine    Smokeless tobacco: Never   Substance Use Topics    Alcohol use: Not Currently     Comment: last drank may 24th    Drug use: Not Currently        Shaq Phillips MD  07/03/25 2112

## 2025-07-03 NOTE — ED TRIAGE NOTES
Pt ambulatory to er with c/o increased weakness , abd  pain, edema , and nausea - pt was diagnosed with liver failure and sent to [facility] in [facility]- pt was discharged home on June 12th- mother with pt states he has a house of his own but has been staying with her since he came home - pt with previous history of alcohol addiction - last drank may 24 th

## 2025-07-04 NOTE — ED NOTES
Called [facility] at  [phone] for possible pt transport.  Awaiting weather check.   DASH diet